# Patient Record
Sex: MALE | Race: OTHER | HISPANIC OR LATINO | Employment: FULL TIME | URBAN - METROPOLITAN AREA
[De-identification: names, ages, dates, MRNs, and addresses within clinical notes are randomized per-mention and may not be internally consistent; named-entity substitution may affect disease eponyms.]

---
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
## 2019-01-24 ENCOUNTER — OFFICE VISIT (OUTPATIENT)
Dept: MEDICAL GROUP | Facility: MEDICAL CENTER | Age: 19
End: 2019-01-24
Payer: COMMERCIAL

## 2019-01-24 VITALS
TEMPERATURE: 98.9 F | OXYGEN SATURATION: 93 % | HEIGHT: 70 IN | SYSTOLIC BLOOD PRESSURE: 106 MMHG | HEART RATE: 108 BPM | DIASTOLIC BLOOD PRESSURE: 60 MMHG | BODY MASS INDEX: 37.16 KG/M2 | WEIGHT: 259.6 LBS

## 2019-01-24 DIAGNOSIS — E66.09 CLASS 2 OBESITY DUE TO EXCESS CALORIES WITHOUT SERIOUS COMORBIDITY WITH BODY MASS INDEX (BMI) OF 37.0 TO 37.9 IN ADULT: ICD-10-CM

## 2019-01-24 DIAGNOSIS — R41.840 DISTURBED CONCENTRATION: ICD-10-CM

## 2019-01-24 DIAGNOSIS — F32.1 CURRENT MODERATE EPISODE OF MAJOR DEPRESSIVE DISORDER, UNSPECIFIED WHETHER RECURRENT (HCC): ICD-10-CM

## 2019-01-24 PROCEDURE — 99214 OFFICE O/P EST MOD 30 MIN: CPT | Performed by: PHYSICIAN ASSISTANT

## 2019-01-24 NOTE — ASSESSMENT & PLAN NOTE
Patient feels that he has trouble concentrating in class, doing homework and during tests. Also notices this during conversations with friends. Has not been testing for ADD/ADHD previously but would be interested in having this done.

## 2019-01-24 NOTE — ASSESSMENT & PLAN NOTE
>>ASSESSMENT AND PLAN FOR MAJOR DEPRESSIVE DISORDER WRITTEN ON 1/24/2019  9:27 AM BY GREGORY CARRERA P.A.-C.    Feels the wellbutrin 150mg XL is working, fewer sad episodes, more energy. Still having some anxiety

## 2019-01-24 NOTE — PROGRESS NOTES
"Subjective:   Amish Curry is a 18 y.o. male here today for follow up on depression    Disturbed concentration  Patient feels that he has trouble concentrating in class, doing homework and during tests. Also notices this during conversations with friends. Has not been testing for ADD/ADHD previously but would be interested in having this done.    Class 2 obesity due to excess calories without serious comorbidity with body mass index (BMI) of 37.0 to 37.9 in adult  Trying to get out more and exercise. Still having trouble with eating too much.    Major depressive disorder  Feels the wellbutrin 150mg XL is working, fewer sad episodes, more energy. Still having some anxiety       Current medicines (including changes today)  Current Outpatient Prescriptions   Medication Sig Dispense Refill   • buPROPion SR (WELLBUTRIN-SR) 150 MG TABLET SR 12 HR sustained-release tablet Take 1 Tab by mouth every day for 30 days. 30 Tab 3     No current facility-administered medications for this visit.      He  has a past medical history of Allergy; Blurred vision; Breath shortness; Headache; Other specified disorder of intestines; and Pain.    ROS   No fever/chills. No weight change. No headache/dizziness. No focal weakness. No sore throat, nasal congestion, ear pain. No chest pain, no shortness of breath, difficulty breathing. No n/v/d/c or abdominal pain. No urinary complaint. No rash or skin lesion. No joint pain or swelling.     Objective:     Blood pressure 106/60, pulse (!) 108, temperature 37.2 °C (98.9 °F), temperature source Temporal, height 1.778 m (5' 10\"), weight 117.8 kg (259 lb 9.6 oz), SpO2 93 %. Body mass index is 37.25 kg/m².   Physical Exam:  Constitutional: WDWN, NAD  Skin: Warm, dry, good turgor, no rashes in visible areas.  Psych: Alert and oriented x3, normal affect and mood.    Assessment and Plan:   The following treatment plan was discussed    1. Current moderate episode of major depressive disorder, " unspecified whether recurrent (HCC)  Cont current, discussed daily exercise, asked patient to consider therapy, he is still not sure he wants to do therapy    2. Disturbed concentration    - REFERRAL TO PSYCHOLOGY    3. Class 2 obesity due to excess calories without serious comorbidity with body mass index (BMI) of 37.0 to 37.9 in adult        Followup: Return in about 3 months (around 4/24/2019).

## 2019-01-30 DIAGNOSIS — F32.1 CURRENT MODERATE EPISODE OF MAJOR DEPRESSIVE DISORDER, UNSPECIFIED WHETHER RECURRENT (HCC): ICD-10-CM

## 2019-01-30 RX ORDER — BUPROPION HYDROCHLORIDE 150 MG/1
150 TABLET, EXTENDED RELEASE ORAL DAILY
Qty: 30 TAB | Refills: 3 | Status: SHIPPED | OUTPATIENT
Start: 2019-01-30 | End: 2019-03-01

## 2019-01-30 NOTE — TELEPHONE ENCOUNTER
Was the patient seen in the last year in this department? Yes    Does patient have an active prescription for medications requested? No     Received Request Via: Pharmacy     Future Appointments       Provider Department [place]    4/24/2019 9:00 AM Lilia Kern P.A.-C. [facility]

## 2019-04-09 ENCOUNTER — OFFICE VISIT (OUTPATIENT)
Dept: MEDICAL GROUP | Facility: MEDICAL CENTER | Age: 19
End: 2019-04-09
Payer: COMMERCIAL

## 2019-04-09 ENCOUNTER — PATIENT MESSAGE (OUTPATIENT)
Dept: MEDICAL GROUP | Facility: MEDICAL CENTER | Age: 19
End: 2019-04-09

## 2019-04-09 VITALS
DIASTOLIC BLOOD PRESSURE: 66 MMHG | HEIGHT: 70 IN | OXYGEN SATURATION: 94 % | TEMPERATURE: 99.1 F | SYSTOLIC BLOOD PRESSURE: 110 MMHG | WEIGHT: 271.8 LBS | HEART RATE: 95 BPM | RESPIRATION RATE: 14 BRPM | BODY MASS INDEX: 38.91 KG/M2

## 2019-04-09 DIAGNOSIS — H60.391 OTHER INFECTIVE ACUTE OTITIS EXTERNA OF RIGHT EAR: ICD-10-CM

## 2019-04-09 PROCEDURE — 99214 OFFICE O/P EST MOD 30 MIN: CPT | Performed by: PHYSICIAN ASSISTANT

## 2019-04-09 RX ORDER — AMOXICILLIN 500 MG/1
500 CAPSULE ORAL 3 TIMES DAILY
Qty: 30 CAP | Refills: 0 | Status: SHIPPED | OUTPATIENT
Start: 2019-04-09 | End: 2019-04-24

## 2019-04-09 NOTE — PROGRESS NOTES
"Subjective:      Amish Curry is a 19 y.o. male who presents with Otalgia (pain and pressure, mostly in R ear, ringing in R ear, some discomfort in L, comes and goes)            HPIPatient presents with complaint of ear pain. R>L. Chronic, episodic. No injury or trauma. No swimming. Sometimes feels like wetness may be in ear but no actual discharge from ear. No meds or treatments tried. No nasal congestion, sinus pain, headache. No ringing in ears, no hearing loss. No cold symptoms. No allergy symptoms.    ROS no fever/chills. No headache/dizziness. No neck pain or stiffness. No chest pain, SOB or difficulty breathing. No n/v/d/c or abdominal pain. No rash or skin lesion. No joint pain or swelling.    Active Ambulatory Problems     Diagnosis Date Noted   • Family history of diabetes mellitus type II 03/03/2015   • Major depressive disorder 11/28/2018   • History of eye surgery 11/28/2018   • Abnormal vision 11/28/2018   • Class 2 obesity due to excess calories without serious comorbidity with body mass index (BMI) of 37.0 to 37.9 in adult 11/28/2018   • Vegetarian diet 11/28/2018   • Prediabetes 12/13/2018   • Disturbed concentration 01/24/2019     Resolved Ambulatory Problems     Diagnosis Date Noted   • Vegan 03/03/2015   • Obesity (BMI 30-39.9) 03/03/2015   • RUQ abdominal pain 03/03/2015   • Gastroesophageal reflux disease with esophagitis 06/01/2015   • Bilateral posterior neck pain 06/01/2015   • Other specified disorder of gallbladder 08/06/2015     Past Medical History:   Diagnosis Date   • Allergy    • Blurred vision    • Breath shortness    • Headache    • Other specified disorder of intestines    • Pain    patient not taking any regular meds  nkda  Non-smoker   Objective:     /66 (BP Location: Left arm, Patient Position: Sitting, BP Cuff Size: Adult long)   Pulse 95   Temp 37.3 °C (99.1 °F) (Temporal)   Resp 14   Ht 1.778 m (5' 10\")   Wt 123.3 kg (271 lb 12.8 oz)   SpO2 94%   BMI " 39.00 kg/m²      Physical Exam   Constitutional: He is oriented to person, place, and time. He appears well-developed and well-nourished. No distress.   HENT:   Head: Normocephalic and atraumatic.   Right Ear: Tympanic membrane and external ear normal. There is swelling.   Left Ear: Hearing, tympanic membrane, external ear and ear canal normal.   Right canal with erythema and swelling   Eyes: Conjunctivae are normal.   Neck: Normal range of motion. Neck supple.   Lymphadenopathy:     He has no cervical adenopathy.   Neurological: He is alert and oriented to person, place, and time.   Skin: Skin is warm and dry. No rash noted. He is not diaphoretic. No pallor.   Psychiatric: He has a normal mood and affect. His behavior is normal. Judgment and thought content normal.   Vitals reviewed.              Assessment/Plan:     1. Other infective acute otitis externa of right ear    - ciprofloxacin (CIPRO HC OTIC) 0.2-1 % Suspension; Place 3 Drops in right ear 2 times a day for 7 days.  Dispense: 1 Bottle; Refill: 0  - amoxicillin (AMOXIL) 500 MG Cap; Take 1 Cap by mouth 3 times a day.  Dispense: 30 Cap; Refill: 0    F/u prn

## 2019-04-24 ENCOUNTER — OFFICE VISIT (OUTPATIENT)
Dept: MEDICAL GROUP | Facility: MEDICAL CENTER | Age: 19
End: 2019-04-24
Payer: COMMERCIAL

## 2019-04-24 VITALS
BODY MASS INDEX: 39.05 KG/M2 | OXYGEN SATURATION: 92 % | TEMPERATURE: 98.6 F | HEIGHT: 70 IN | WEIGHT: 272.8 LBS | SYSTOLIC BLOOD PRESSURE: 118 MMHG | HEART RATE: 74 BPM | DIASTOLIC BLOOD PRESSURE: 68 MMHG | RESPIRATION RATE: 16 BRPM

## 2019-04-24 DIAGNOSIS — M26.623 TENDERNESS OF BOTH TEMPOROMANDIBULAR JOINTS: ICD-10-CM

## 2019-04-24 DIAGNOSIS — F32.1 CURRENT MODERATE EPISODE OF MAJOR DEPRESSIVE DISORDER, UNSPECIFIED WHETHER RECURRENT (HCC): ICD-10-CM

## 2019-04-24 DIAGNOSIS — F41.9 ANXIETY: ICD-10-CM

## 2019-04-24 DIAGNOSIS — R41.840 DISTURBED CONCENTRATION: ICD-10-CM

## 2019-04-24 PROCEDURE — 99214 OFFICE O/P EST MOD 30 MIN: CPT | Performed by: PHYSICIAN ASSISTANT

## 2019-04-24 RX ORDER — BUSPIRONE HYDROCHLORIDE 7.5 MG/1
7.5 TABLET ORAL 2 TIMES DAILY
Qty: 60 TAB | Refills: 0 | Status: SHIPPED | OUTPATIENT
Start: 2019-04-24 | End: 2019-05-24

## 2019-04-24 NOTE — PROGRESS NOTES
Subjective:      Amish Curry is a 19 y.o. male who presents with Follow-Up (depression) and Otalgia (took course of abx, still having ear pain, states problem came back)            HPI  Patient presents with a few complaints:  1. bilat ear pain off and on. Does also have jaw/tmj pain. Some jaw clicking. Hasn't been to the dentist in a few years.   2. Stopped wellbutrin d/t focus worsening, having more anxiety, willing to see psychiatrist, would also be willing to try a new medication, not feeling depressed  3. Did see someone regarding concentration issues but did not prescribe medication, he would like to see the psychiatrist for this lisha    Edel weight change. No fever/chills. No headache/dizziness. No sinus pain or nasal congestion. No neck pain or stiffness. No n/v/d/c or abdominal pain. No rash or skin lesion. No joint pain or swelling    Active Ambulatory Problems     Diagnosis Date Noted   • Family history of diabetes mellitus type II 03/03/2015   • Major depressive disorder 11/28/2018   • History of eye surgery 11/28/2018   • Abnormal vision 11/28/2018   • Class 2 obesity due to excess calories without serious comorbidity with body mass index (BMI) of 37.0 to 37.9 in adult 11/28/2018   • Vegetarian diet 11/28/2018   • Prediabetes 12/13/2018   • Disturbed concentration 01/24/2019     Resolved Ambulatory Problems     Diagnosis Date Noted   • Vegan 03/03/2015   • Obesity (BMI 30-39.9) 03/03/2015   • RUQ abdominal pain 03/03/2015   • Gastroesophageal reflux disease with esophagitis 06/01/2015   • Bilateral posterior neck pain 06/01/2015   • Other specified disorder of gallbladder 08/06/2015     Past Medical History:   Diagnosis Date   • Allergy    • Blurred vision    • Breath shortness    • Headache    • Other specified disorder of intestines    • Pain      Current Outpatient Prescriptions   Medication Sig Dispense Refill   • busPIRone (BUSPAR) 7.5 MG tablet Take 1 Tab by mouth 2 times a day for 30  "days. 60 Tab 0     No current facility-administered medications for this visit.    nkda  Non-smoker   Objective:     /68 (BP Location: Left arm, Patient Position: Sitting, BP Cuff Size: Adult)   Pulse 74   Temp 37 °C (98.6 °F) (Temporal)   Resp 16   Ht 1.778 m (5' 10\")   Wt 123.7 kg (272 lb 12.8 oz)   SpO2 92%   BMI 39.14 kg/m²      Physical Exam   Constitutional: He is oriented to person, place, and time. He appears well-developed and well-nourished. No distress.   HENT:   Head: Normocephalic and atraumatic.       Right Ear: Hearing, tympanic membrane, external ear and ear canal normal.   Left Ear: Hearing, tympanic membrane, external ear and ear canal normal.   Nose: Nose normal. Right sinus exhibits no maxillary sinus tenderness and no frontal sinus tenderness. Left sinus exhibits no maxillary sinus tenderness and no frontal sinus tenderness.   Mouth/Throat: Uvula is midline, oropharynx is clear and moist and mucous membranes are normal.   Mild ttp, no visible redness or swelling   Eyes: Conjunctivae are normal.   Neck: Normal range of motion. Neck supple.   Lymphadenopathy:     He has no cervical adenopathy.   Neurological: He is alert and oriented to person, place, and time.   Skin: Skin is warm and dry. No rash noted. He is not diaphoretic. No pallor.   Psychiatric: He has a normal mood and affect. His behavior is normal. Judgment and thought content normal.   Vitals reviewed.              Assessment/Plan:     1. Disturbed concentration    - REFERRAL TO PSYCHIATRY    2. Current moderate episode of major depressive disorder, unspecified whether recurrent (HCC)    - REFERRAL TO PSYCHIATRY    3. Anxiety  Discussed risk/benefit and potential side effects of medication  - REFERRAL TO PSYCHIATRY  - busPIRone (BUSPAR) 7.5 MG tablet; Take 1 Tab by mouth 2 times a day for 30 days.  Dispense: 60 Tab; Refill: 0    4. Tenderness of both temporomandibular joints  Soft foods, warm compresses, ibuprofen, f/u " with dentist

## 2019-08-01 ENCOUNTER — NON-PROVIDER VISIT (OUTPATIENT)
Dept: URGENT CARE | Facility: PHYSICIAN GROUP | Age: 19
End: 2019-08-01

## 2019-08-01 DIAGNOSIS — Z02.1 PRE-EMPLOYMENT DRUG SCREENING: ICD-10-CM

## 2019-08-01 LAB
AMP AMPHETAMINE: NORMAL
COC COCAINE: NORMAL
INT CON NEG: NEGATIVE
INT CON POS: POSITIVE
MET METHAMPHETAMINES: NORMAL
OPI OPIATES: NORMAL
PCP PHENCYCLIDINE: NORMAL
POC DRUG COMMENT 753798-OCCUPATIONAL HEALTH: NEGATIVE
THC: NORMAL

## 2019-08-01 PROCEDURE — 80305 DRUG TEST PRSMV DIR OPT OBS: CPT | Performed by: PHYSICIAN ASSISTANT

## 2019-12-30 ENCOUNTER — OFFICE VISIT (OUTPATIENT)
Dept: URGENT CARE | Facility: CLINIC | Age: 19
End: 2019-12-30
Payer: COMMERCIAL

## 2019-12-30 VITALS
TEMPERATURE: 98.7 F | WEIGHT: 272 LBS | HEIGHT: 70 IN | BODY MASS INDEX: 38.94 KG/M2 | SYSTOLIC BLOOD PRESSURE: 122 MMHG | OXYGEN SATURATION: 95 % | DIASTOLIC BLOOD PRESSURE: 68 MMHG | HEART RATE: 73 BPM | RESPIRATION RATE: 15 BRPM

## 2019-12-30 DIAGNOSIS — R10.9 FLANK PAIN: ICD-10-CM

## 2019-12-30 DIAGNOSIS — R31.9 HEMATURIA, UNSPECIFIED TYPE: ICD-10-CM

## 2019-12-30 LAB
APPEARANCE UR: CLEAR
BILIRUB UR STRIP-MCNC: NEGATIVE MG/DL
COLOR UR AUTO: YELLOW
GLUCOSE UR STRIP.AUTO-MCNC: NEGATIVE MG/DL
KETONES UR STRIP.AUTO-MCNC: NEGATIVE MG/DL
LEUKOCYTE ESTERASE UR QL STRIP.AUTO: NEGATIVE
NITRITE UR QL STRIP.AUTO: NEGATIVE
PH UR STRIP.AUTO: 5.5 [PH] (ref 5–8)
PROT UR QL STRIP: NEGATIVE MG/DL
RBC UR QL AUTO: NORMAL
SP GR UR STRIP.AUTO: 1.03
UROBILINOGEN UR STRIP-MCNC: 0.2 MG/DL

## 2019-12-30 PROCEDURE — 81002 URINALYSIS NONAUTO W/O SCOPE: CPT | Performed by: NURSE PRACTITIONER

## 2019-12-30 PROCEDURE — 99214 OFFICE O/P EST MOD 30 MIN: CPT | Performed by: NURSE PRACTITIONER

## 2019-12-30 ASSESSMENT — ENCOUNTER SYMPTOMS
NAUSEA: 0
VOMITING: 0
CHANGE IN BOWEL HABIT: 0
ABDOMINAL PAIN: 1
PAIN: 1
MYALGIAS: 0
FATIGUE: 0
CHILLS: 0
DIZZINESS: 0
SHORTNESS OF BREATH: 0
EYE PAIN: 0
FEVER: 0
SORE THROAT: 0
FLANK PAIN: 1

## 2019-12-30 NOTE — LETTER
December 30, 2019         Patient: Amish Curry   YOB: 2000   Date of Visit: 12/30/2019           To Whom it May Concern:    Amish Curry was seen in my clinic on 12/30/2019. He may return to work on 12/31/19.    If you have any questions or concerns, please don't hesitate to call.        Sincerely,           BERTA Peacock.  Electronically Signed

## 2019-12-31 ENCOUNTER — TELEPHONE (OUTPATIENT)
Dept: URGENT CARE | Facility: CLINIC | Age: 19
End: 2019-12-31

## 2019-12-31 NOTE — PROGRESS NOTES
"Subjective:   Amish Curry  is a 19 y.o. male who presents for GI Problem        Pain   This is a new problem. The current episode started in the past 7 days. The problem occurs intermittently. The problem has been waxing and waning. Associated symptoms include abdominal pain. Pertinent negatives include no change in bowel habit, chest pain, chills, fatigue, fever, myalgias, nausea, rash, sore throat, urinary symptoms or vomiting. Nothing aggravates the symptoms. He has tried nothing for the symptoms. The treatment provided no relief.     Review of Systems   Constitutional: Negative for chills, fatigue and fever.   HENT: Negative for sore throat.    Eyes: Negative for pain.   Respiratory: Negative for shortness of breath.    Cardiovascular: Negative for chest pain.   Gastrointestinal: Positive for abdominal pain. Negative for change in bowel habit, nausea and vomiting.   Genitourinary: Positive for flank pain. Negative for dysuria, frequency, hematuria and urgency.   Musculoskeletal: Negative for myalgias.   Skin: Negative for rash.   Neurological: Negative for dizziness.     No Known Allergies   Objective:   /68   Pulse 73   Temp 37.1 °C (98.7 °F) (Temporal)   Resp 15   Ht 1.778 m (5' 10\")   Wt 123.4 kg (272 lb)   SpO2 95%   BMI 39.03 kg/m²   Physical Exam  Vitals signs and nursing note reviewed.   Constitutional:       General: He is not in acute distress.     Appearance: He is well-developed.   HENT:      Head: Normocephalic and atraumatic.      Right Ear: External ear normal.      Left Ear: External ear normal.      Nose: Nose normal.      Mouth/Throat:      Mouth: Mucous membranes are moist.   Eyes:      Conjunctiva/sclera: Conjunctivae normal.      Pupils: Pupils are equal, round, and reactive to light.   Cardiovascular:      Rate and Rhythm: Normal rate and regular rhythm.      Heart sounds: No murmur.   Pulmonary:      Effort: Pulmonary effort is normal. No respiratory distress.      " Breath sounds: Normal breath sounds.   Abdominal:      General: Bowel sounds are normal. There is no distension.      Palpations: Abdomen is soft.      Tenderness: There is tenderness in the suprapubic area and left lower quadrant. There is left CVA tenderness.   Musculoskeletal: Normal range of motion.   Skin:     General: Skin is warm and dry.   Neurological:      General: No focal deficit present.      Mental Status: He is alert and oriented to person, place, and time. Mental status is at baseline.      Gait: Gait (gait at baseline) normal.   Psychiatric:         Judgment: Judgment normal.           Assessment/Plan:   1. Flank pain  - POCT Urinalysis  - CT-RENAL COLIC EVALUATION(A/P W/O); Future    2. Hematuria, unspecified type  - POCT Urinalysis  - CT-RENAL COLIC EVALUATION(A/P W/O); Future    Patient is a 19-year-old male presents clinic today for evaluation for the stated above.  Patient is nontoxic appearing, patient with mild abdominal tenderness, left-sided flank pain, hematuria differentials include but not limited to kidney stone.  We will follow-up with pending CT results and treat as indicated.  At clinical location and clinical our CT will need to be scheduled outpatient for tomorrow a.m.  Advised may take 600-800 mg ibuprofen 3 times daily as needed for pain.     Patient given precautionary s/sx that mandate immediate follow up and evaluation in the ED. Advised of risks of not doing so.    DDX, Supportive care, and indications for immediate follow-up discussed with patient.    Instructed to return to clinic or nearest emergency department if we are not available for any change in condition, further concerns, or worsening of symptoms.    The patient demonstrated a good understanding and agreed with the treatment plan.

## 2019-12-31 NOTE — TELEPHONE ENCOUNTER
Attempted to make an appointment for CT but he has [employer] for health insurance. He has to go through [facility]. I called them to schedule an appointment and unfortunley there are no CT techs today.

## 2020-01-13 ENCOUNTER — OFFICE VISIT (OUTPATIENT)
Dept: MEDICAL GROUP | Facility: MEDICAL CENTER | Age: 20
End: 2020-01-13
Payer: COMMERCIAL

## 2020-01-13 ENCOUNTER — HOSPITAL ENCOUNTER (OUTPATIENT)
Facility: MEDICAL CENTER | Age: 20
End: 2020-01-13
Attending: FAMILY MEDICINE
Payer: COMMERCIAL

## 2020-01-13 VITALS
WEIGHT: 241.2 LBS | HEART RATE: 71 BPM | OXYGEN SATURATION: 97 % | BODY MASS INDEX: 34.53 KG/M2 | TEMPERATURE: 97.5 F | HEIGHT: 70 IN | SYSTOLIC BLOOD PRESSURE: 108 MMHG | DIASTOLIC BLOOD PRESSURE: 64 MMHG | RESPIRATION RATE: 18 BRPM

## 2020-01-13 DIAGNOSIS — R73.03 PREDIABETES: ICD-10-CM

## 2020-01-13 DIAGNOSIS — R31.9 HEMATURIA, UNSPECIFIED TYPE: ICD-10-CM

## 2020-01-13 LAB
APPEARANCE UR: CLEAR
APPEARANCE UR: NEGATIVE
BACTERIA #/AREA URNS HPF: NEGATIVE /HPF
BILIRUB UR QL STRIP.AUTO: NEGATIVE
BILIRUB UR STRIP-MCNC: NEGATIVE MG/DL
COLOR UR AUTO: NORMAL
COLOR UR: YELLOW
EPI CELLS #/AREA URNS HPF: NEGATIVE /HPF
GLUCOSE UR STRIP.AUTO-MCNC: NEGATIVE MG/DL
GLUCOSE UR STRIP.AUTO-MCNC: NEGATIVE MG/DL
HYALINE CASTS #/AREA URNS LPF: ABNORMAL /LPF
KETONES UR STRIP.AUTO-MCNC: NEGATIVE MG/DL
KETONES UR STRIP.AUTO-MCNC: NEGATIVE MG/DL
LEUKOCYTE ESTERASE UR QL STRIP.AUTO: NEGATIVE
LEUKOCYTE ESTERASE UR QL STRIP.AUTO: NEGATIVE
MICRO URNS: ABNORMAL
NITRITE UR QL STRIP.AUTO: NEGATIVE
NITRITE UR QL STRIP.AUTO: NEGATIVE
PH UR STRIP.AUTO: 6.5 [PH] (ref 5–8)
PH UR STRIP.AUTO: 7 [PH] (ref 5–8)
PROT UR QL STRIP: NEGATIVE MG/DL
PROT UR QL STRIP: NORMAL MG/DL
RBC # URNS HPF: ABNORMAL /HPF
RBC UR QL AUTO: ABNORMAL
RBC UR QL AUTO: NORMAL
SP GR UR STRIP.AUTO: 1.02
SP GR UR STRIP.AUTO: 1.02
UROBILINOGEN UR STRIP-MCNC: NORMAL MG/DL
UROBILINOGEN UR STRIP.AUTO-MCNC: 0.2 MG/DL
WBC #/AREA URNS HPF: ABNORMAL /HPF

## 2020-01-13 PROCEDURE — 81002 URINALYSIS NONAUTO W/O SCOPE: CPT | Performed by: FAMILY MEDICINE

## 2020-01-13 PROCEDURE — 87591 N.GONORRHOEAE DNA AMP PROB: CPT

## 2020-01-13 PROCEDURE — 81001 URINALYSIS AUTO W/SCOPE: CPT

## 2020-01-13 PROCEDURE — 99214 OFFICE O/P EST MOD 30 MIN: CPT | Performed by: FAMILY MEDICINE

## 2020-01-13 PROCEDURE — 99000 SPECIMEN HANDLING OFFICE-LAB: CPT | Performed by: FAMILY MEDICINE

## 2020-01-13 PROCEDURE — 87491 CHLMYD TRACH DNA AMP PROBE: CPT

## 2020-01-13 ASSESSMENT — PATIENT HEALTH QUESTIONNAIRE - PHQ9
7. TROUBLE CONCENTRATING ON THINGS, SUCH AS READING THE NEWSPAPER OR WATCHING TELEVISION: MORE THAN HALF THE DAYS
2. FEELING DOWN, DEPRESSED, IRRITABLE, OR HOPELESS: MORE THAN HALF THE DAYS
SUM OF ALL RESPONSES TO PHQ QUESTIONS 1-9: 16
4. FEELING TIRED OR HAVING LITTLE ENERGY: MORE THAN HALF THE DAYS
3. TROUBLE FALLING OR STAYING ASLEEP OR SLEEPING TOO MUCH: MORE THAN HALF THE DAYS
8. MOVING OR SPEAKING SO SLOWLY THAT OTHER PEOPLE COULD HAVE NOTICED. OR THE OPPOSITE, BEING SO FIGETY OR RESTLESS THAT YOU HAVE BEEN MOVING AROUND A LOT MORE THAN USUAL: MORE THAN HALF THE DAYS
9. THOUGHTS THAT YOU WOULD BE BETTER OFF DEAD, OR OF HURTING YOURSELF: NOT AT ALL
6. FEELING BAD ABOUT YOURSELF - OR THAT YOU ARE A FAILURE OR HAVE LET YOURSELF OR YOUR FAMILY DOWN: MORE THAN HALF THE DAYS
SUM OF ALL RESPONSES TO PHQ9 QUESTIONS 1 AND 2: 4
5. POOR APPETITE OR OVEREATING: MORE THAN HALF THE DAYS
1. LITTLE INTEREST OR PLEASURE IN DOING THINGS: MORE THAN HALF THE DAYS

## 2020-01-13 NOTE — PROGRESS NOTES
[facility]  Progress Note  Established Patient    Subjective:   Amish Curry is a 19 y.o. male here today with a chief complaint of blood in urine. The patient is alone.     Hematuria  Presents with a new problem.  For the past few weeks he has had intermittent episodes of pink-tinged urine.  He denies clots.  It tends to be worse when he is not drinking water or first thing in the morning.  He associates some mild dysuria with urinary urgency.  He denies urinary incontinence.  He denies testicular pain.  He denies abnormal penile discharge.  He went to the urgent care where a urinalysis demonstrated hematuria.  They ordered a CT scan but he did not get this done.  Patient does report a concurrent history of lower back pain which may have preexisted but no flank pain.  His symptoms are mild in severity.  He does not identify any alleviating factors    Prediabetes  Patient has a history of prediabetes.  He states he would like to recheck this today.      No current outpatient medications on file prior to visit.     No current facility-administered medications on file prior to visit.        Past Medical History:   Diagnosis Date   • Allergy    • Blurred vision    • Breath shortness     sometimes with gall bladder attack   • Headache    • Other specified disorder of intestines     constipation/diarrhea   • Pain     neck, back, chest, right upper abd       Allergies: Patient has no known allergies.    Surgical History:  has a past surgical history that includes emelina by laparoscopy (N/A, 8/6/2015); eye surgery; and eye muscle repair.    Family History: family history includes Diabetes in his maternal grandmother and paternal grandmother; Hyperlipidemia in his father.    Social History:  reports that he has never smoked. He has never used smokeless tobacco. He reports current alcohol use. He reports that he does not use drugs.    ROS: see HPI.        Objective:     Vitals:    01/13/20 1437   BP: 108/64  "  BP Location: Left arm   Patient Position: Sitting   BP Cuff Size: Adult   Pulse: 71   Resp: 18   Temp: 36.4 °C (97.5 °F)   TempSrc: Temporal   SpO2: 97%   Weight: 109.4 kg (241 lb 3.2 oz)   Height: 1.778 m (5' 10\")       Physical Exam:  General: alert in no apparent distress.   Back: no CVAT.   GI: no suprapubic tenderness.     POC UA: blood.         Assessment and Plan:     1. Hematuria, unspecified type  Patient reports gross hematuria with blood in the urine on point-of-care urinalysis.  There is no other evidence of infection on urinalysis.  The patient does describe some back pain but it certainly does not sound consistent with a nephrolithiasis type pain.  Because of the uncertain clinical history, I will send him out for the following labs and a formal urinalysis with GC and Chlamydia testing.  Before I order imaging, I also think would be wise to have the patient see urology.  He may require cystoscopy, CT urogram or a CT renal colic so I think it would be best if they decided before we subject him to 1 of these tests, especially considering the mild nature of his complaints.  - CBC WITH DIFFERENTIAL; Future  - URINALYSIS,CULTURE IF INDICATED; Future  - Chlamydia/GC PCR Urine Or Swab; Future  - POCT Urinalysis  - REFERRAL TO UROLOGY, urgent consult placed.    2. Prediabetes  - HEMOGLOBIN A1C; Future  - Comp Metabolic Panel; Future        Followup: Return if symptoms worsen or fail to improve.         "

## 2020-01-13 NOTE — ASSESSMENT & PLAN NOTE
Presents with a new problem.  For the past few weeks he has had intermittent episodes of pink-tinged urine.  He denies clots.  It tends to be worse when he is not drinking water or first thing in the morning.  He associates some mild dysuria with urinary urgency.  He denies urinary incontinence.  He denies testicular pain.  He denies abnormal penile discharge.  He went to the urgent care where a urinalysis demonstrated hematuria.  They ordered a CT scan but he did not get this done.  Patient does report a concurrent history of lower back pain which may have preexisted but no flank pain.  His symptoms are mild in severity.  He does not identify any alleviating factors

## 2020-01-14 ENCOUNTER — HOSPITAL ENCOUNTER (OUTPATIENT)
Dept: LAB | Facility: MEDICAL CENTER | Age: 20
End: 2020-01-14
Attending: FAMILY MEDICINE
Payer: COMMERCIAL

## 2020-01-14 DIAGNOSIS — R73.03 PREDIABETES: ICD-10-CM

## 2020-01-14 DIAGNOSIS — R31.9 HEMATURIA, UNSPECIFIED TYPE: ICD-10-CM

## 2020-01-14 LAB
ALBUMIN SERPL BCP-MCNC: 4.3 G/DL (ref 3.2–4.9)
ALBUMIN/GLOB SERPL: 1.5 G/DL
ALP SERPL-CCNC: 91 U/L (ref 30–99)
ALT SERPL-CCNC: 19 U/L (ref 2–50)
ANION GAP SERPL CALC-SCNC: 8 MMOL/L (ref 0–11.9)
AST SERPL-CCNC: 14 U/L (ref 12–45)
BASOPHILS # BLD AUTO: 1.2 % (ref 0–1.8)
BASOPHILS # BLD: 0.1 K/UL (ref 0–0.12)
BILIRUB SERPL-MCNC: 0.4 MG/DL (ref 0.1–1.5)
BUN SERPL-MCNC: 11 MG/DL (ref 8–22)
C TRACH DNA SPEC QL NAA+PROBE: NEGATIVE
CALCIUM SERPL-MCNC: 9.1 MG/DL (ref 8.5–10.5)
CHLORIDE SERPL-SCNC: 101 MMOL/L (ref 96–112)
CO2 SERPL-SCNC: 29 MMOL/L (ref 20–33)
CREAT SERPL-MCNC: 0.62 MG/DL (ref 0.5–1.4)
EOSINOPHIL # BLD AUTO: 0.08 K/UL (ref 0–0.51)
EOSINOPHIL NFR BLD: 0.9 % (ref 0–6.9)
ERYTHROCYTE [DISTWIDTH] IN BLOOD BY AUTOMATED COUNT: 47.3 FL (ref 35.9–50)
EST. AVERAGE GLUCOSE BLD GHB EST-MCNC: 103 MG/DL
FASTING STATUS PATIENT QL REPORTED: NORMAL
GLOBULIN SER CALC-MCNC: 2.9 G/DL (ref 1.9–3.5)
GLUCOSE SERPL-MCNC: 78 MG/DL (ref 65–99)
HBA1C MFR BLD: 5.2 % (ref 0–5.6)
HCT VFR BLD AUTO: 52.9 % (ref 42–52)
HGB BLD-MCNC: 16.5 G/DL (ref 14–18)
IMM GRANULOCYTES # BLD AUTO: 0.03 K/UL (ref 0–0.11)
IMM GRANULOCYTES NFR BLD AUTO: 0.3 % (ref 0–0.9)
LYMPHOCYTES # BLD AUTO: 2.73 K/UL (ref 1–4.8)
LYMPHOCYTES NFR BLD: 31.8 % (ref 22–41)
MCH RBC QN AUTO: 29.5 PG (ref 27–33)
MCHC RBC AUTO-ENTMCNC: 31.2 G/DL (ref 33.7–35.3)
MCV RBC AUTO: 94.5 FL (ref 81.4–97.8)
MONOCYTES # BLD AUTO: 0.58 K/UL (ref 0–0.85)
MONOCYTES NFR BLD AUTO: 6.8 % (ref 0–13.4)
N GONORRHOEA DNA SPEC QL NAA+PROBE: NEGATIVE
NEUTROPHILS # BLD AUTO: 5.07 K/UL (ref 1.82–7.42)
NEUTROPHILS NFR BLD: 59 % (ref 44–72)
NRBC # BLD AUTO: 0 K/UL
NRBC BLD-RTO: 0 /100 WBC
PLATELET # BLD AUTO: 388 K/UL (ref 164–446)
PMV BLD AUTO: 10.3 FL (ref 9–12.9)
POTASSIUM SERPL-SCNC: 4.2 MMOL/L (ref 3.6–5.5)
PROT SERPL-MCNC: 7.2 G/DL (ref 6–8.2)
RBC # BLD AUTO: 5.6 M/UL (ref 4.7–6.1)
SODIUM SERPL-SCNC: 138 MMOL/L (ref 135–145)
SPECIMEN SOURCE: NORMAL
WBC # BLD AUTO: 8.6 K/UL (ref 4.8–10.8)

## 2020-01-14 PROCEDURE — 85025 COMPLETE CBC W/AUTO DIFF WBC: CPT

## 2020-01-14 PROCEDURE — 80053 COMPREHEN METABOLIC PANEL: CPT

## 2020-01-14 PROCEDURE — 83036 HEMOGLOBIN GLYCOSYLATED A1C: CPT

## 2020-01-14 PROCEDURE — 36415 COLL VENOUS BLD VENIPUNCTURE: CPT

## 2020-01-15 ENCOUNTER — HOSPITAL ENCOUNTER (OUTPATIENT)
Dept: LAB | Facility: MEDICAL CENTER | Age: 20
End: 2020-01-15
Attending: PHYSICIAN ASSISTANT
Payer: COMMERCIAL

## 2020-01-15 PROCEDURE — 87086 URINE CULTURE/COLONY COUNT: CPT

## 2020-01-17 LAB
BACTERIA UR CULT: ABNORMAL
BACTERIA UR CULT: ABNORMAL
SIGNIFICANT IND 70042: ABNORMAL
SITE SITE: ABNORMAL
SOURCE SOURCE: ABNORMAL

## 2020-10-25 NOTE — ASSESSMENT & PLAN NOTE
Feels the wellbutrin 150mg XL is working, fewer sad episodes, more energy. Still having some anxiety   No

## 2020-11-25 ENCOUNTER — HOSPITAL ENCOUNTER (OUTPATIENT)
Facility: MEDICAL CENTER | Age: 20
End: 2020-11-25
Attending: PHYSICIAN ASSISTANT
Payer: COMMERCIAL

## 2020-11-25 ENCOUNTER — OFFICE VISIT (OUTPATIENT)
Dept: URGENT CARE | Facility: PHYSICIAN GROUP | Age: 20
End: 2020-11-25
Payer: COMMERCIAL

## 2020-11-25 VITALS
RESPIRATION RATE: 16 BRPM | TEMPERATURE: 97 F | WEIGHT: 247 LBS | BODY MASS INDEX: 35.36 KG/M2 | OXYGEN SATURATION: 95 % | HEART RATE: 112 BPM | DIASTOLIC BLOOD PRESSURE: 80 MMHG | SYSTOLIC BLOOD PRESSURE: 112 MMHG | HEIGHT: 70 IN

## 2020-11-25 DIAGNOSIS — Z20.822 SUSPECTED COVID-19 VIRUS INFECTION: ICD-10-CM

## 2020-11-25 LAB — COVID ORDER STATUS COVID19: NORMAL

## 2020-11-25 PROCEDURE — 99214 OFFICE O/P EST MOD 30 MIN: CPT | Mod: CS | Performed by: PHYSICIAN ASSISTANT

## 2020-11-25 PROCEDURE — U0003 INFECTIOUS AGENT DETECTION BY NUCLEIC ACID (DNA OR RNA); SEVERE ACUTE RESPIRATORY SYNDROME CORONAVIRUS 2 (SARS-COV-2) (CORONAVIRUS DISEASE [COVID-19]), AMPLIFIED PROBE TECHNIQUE, MAKING USE OF HIGH THROUGHPUT TECHNOLOGIES AS DESCRIBED BY CMS-2020-01-R: HCPCS

## 2020-11-25 RX ORDER — AMOXICILLIN 500 MG/1
TABLET, FILM COATED ORAL
COMMUNITY
End: 2021-05-12

## 2020-11-25 ASSESSMENT — ENCOUNTER SYMPTOMS
CHILLS: 1
SORE THROAT: 1
NECK PAIN: 0
ABDOMINAL PAIN: 0
MYALGIAS: 1
SHORTNESS OF BREATH: 0
VOMITING: 0
FEVER: 1
COUGH: 1
WHEEZING: 0
EYE REDNESS: 0
EYE PAIN: 0
SPUTUM PRODUCTION: 1
PALPITATIONS: 0
NAUSEA: 0
DIARRHEA: 1
HEADACHES: 1
DIZZINESS: 0

## 2020-11-25 ASSESSMENT — FIBROSIS 4 INDEX: FIB4 SCORE: 0.17

## 2020-11-25 NOTE — LETTER
November 25, 2020   Patient: Amish Curry   YOB: 2000   Date of Visit: 11/25/2020     To Whom it May Concern:    Amish Curry was seen in my clinic on 11/25/2020.  Your employee was seen in our clinic today.  A concern for COVID-19 has been identified and testing is in progress. We are asking you to excuse absences while following self-isolation protocol per Center for Disease Control (CDC) guidelines.  Your employee will be able to access test results through our electronic delivery system called [employer].     If the results of testing are positive, your employee should follow the CDC guidelines.  These are isolation for a minimum of 10 days and at least 24 hours have passed since your last fever without the use of fever-reducing medications and all other symptoms have improved.  The health department may contact you and provide further directions regarding self-isolation and return to work.     Negative result without close contact*:  If your employee was tested due to their symptoms without close contact to someone with COVID-19 and their test is negative: your employee should not return to work or regular activities until 24 hours after symptoms fully improve. (For example, if patient feels back to normal on Tuesday, should remain isolated through Wednesday).      Negative with close contact*:  If your employee was tested due to close contact to someone, they should self isolate for 14 days after their exposure.    Negative with positive household member  If your employee was due to a positive household member they should still quarantine for a period of 14 days.  The 14 day period begins once the household member is isolated. If unable to quarantine (for example mom from infant), the CDC advises an additional 14-day quarantine period from the COVID-19 household member.   In general, repeat testing is not necessary and not offered through our [facility] urgent care.     This is the  only note that will be provided from [facility] for this visit.  Your employee will require an appointment with a primary care provider if FMLA or disability forms are required.  If you have any questions please do not hesitate to call me at the phone number listed below.    Sincerely,    QUE Dyer.-C.  [phone]

## 2020-11-25 NOTE — PROGRESS NOTES
Subjective:   Amish Curry is a 20 y.o. male who presents for Headache (x2days bodyaches sore throat diarrhea)      HPI  20 y.o. male presents to urgent care with new problem to provider of sore throat, headache, body aches, diarrhea, cough, and congestion worsening since onset 2 days ago.  Patient reports potential exposure to Covid 19, however this is not confirmed.  He does report his family members at home have similar symptoms.  They have not been tested for COVID-19.  Patient reports subjective fevers and chills.  He denies shortness of breath, wheezing, or chest pain.  He has not been taking any over-the-counter medications for his symptoms. Denies other associated aggravating or alleviating factors.     Review of Systems   Constitutional: Positive for chills, fever and malaise/fatigue.   HENT: Positive for congestion and sore throat. Negative for ear pain.    Eyes: Negative for pain and redness.   Respiratory: Positive for cough and sputum production. Negative for shortness of breath and wheezing.    Cardiovascular: Negative for chest pain and palpitations.   Gastrointestinal: Positive for diarrhea. Negative for abdominal pain, nausea and vomiting.   Musculoskeletal: Positive for myalgias. Negative for neck pain.   Skin: Negative for rash.   Neurological: Positive for headaches. Negative for dizziness.   Endo/Heme/Allergies: Negative for environmental allergies.       Patient Active Problem List   Diagnosis   • Family history of diabetes mellitus type II   • Major depressive disorder   • History of eye surgery   • Abnormal vision   • Class 2 obesity due to excess calories without serious comorbidity with body mass index (BMI) of 37.0 to 37.9 in adult   • Vegetarian diet   • Prediabetes   • Disturbed concentration   • Hematuria     Past Surgical History:   Procedure Laterality Date   • COURTNEY BY LAPAROSCOPY N/A 8/6/2015    Procedure: COURTNEY BY LAPAROSCOPY;  Surgeon: Roya Adams M.D.;  Location:  "SURGERY [facility];  Service:    • EYE MUSCLE REPAIR     • EYE SURGERY       Social History     Tobacco Use   • Smoking status: Never Smoker   • Smokeless tobacco: Never Used   Substance Use Topics   • Alcohol use: Yes   • Drug use: No      Family History   Problem Relation Age of Onset   • Hyperlipidemia Father    • Diabetes Maternal Grandmother    • Diabetes Paternal Grandmother    • Stroke Neg Hx    • Heart Attack Neg Hx    • Heart Disease Neg Hx    • Cancer Neg Hx       (Allergies, Medications, & Tobacco/Substance Use were reconciled by the Medical Assistant and reviewed by myself. The family history is prepopulated)     Objective:     /80   Pulse (!) 112   Temp 36.1 °C (97 °F) (Temporal)   Resp 16   Ht 1.778 m (5' 10\")   Wt 112 kg (247 lb)   SpO2 95%   BMI 35.44 kg/m²     Physical Exam  Vitals signs reviewed.   Constitutional:       General: He is not in acute distress.     Appearance: Normal appearance. He is well-developed. He is not ill-appearing or diaphoretic.   HENT:      Head: Normocephalic and atraumatic.      Nose: Nose normal.      Mouth/Throat:      Mouth: Mucous membranes are moist.      Pharynx: Oropharynx is clear. Posterior oropharyngeal erythema present. No oropharyngeal exudate.   Eyes:      General: No scleral icterus.     Conjunctiva/sclera: Conjunctivae normal.   Neck:      Musculoskeletal: Normal range of motion and neck supple.   Cardiovascular:      Rate and Rhythm: Regular rhythm. Tachycardia present.      Heart sounds: Normal heart sounds.   Pulmonary:      Effort: Pulmonary effort is normal. No respiratory distress.      Breath sounds: Normal breath sounds. No wheezing, rhonchi or rales.   Musculoskeletal: Normal range of motion.   Lymphadenopathy:      Cervical: No cervical adenopathy.   Skin:     General: Skin is warm and dry.      Findings: No rash.   Neurological:      General: No focal deficit present.      Mental Status: He is alert and oriented to person, " place, and time.   Psychiatric:         Mood and Affect: Mood normal.         Behavior: Behavior normal.         Thought Content: Thought content normal.         Judgment: Judgment normal.         Assessment/Plan:     1. Suspected COVID-19 virus infection  COVID/SARS COV-2 PCR     Patient instructed to self-isolate/quarantine per CDC guidelines.  I will follow-up pending COVID-19 testing. Discussed with patient may obtain hard copy of results on [facility].   Advised patient symptoms are most likely viral in etiology. Increased fluids and rest. Discussed use of OTC cough and cold medication and Tylenol/Motrin for symptomatic relief.  Return for reevaluation or proceed to ED if symptoms persist or worsen. Supportive care, differential diagnoses, and indications for immediate follow-up discussed with patient. Patient should to proceed to ED for development of symptoms including but not limited to shortness of breath breath, difficulty breathing, or worsening symptoms not manageable at home.   The patient demonstrated a good understanding and agreed with the treatment plan and has no further questions regarding care.   Vital signs stable, patient in no acute respiratory distress.  Discussed with patient at length importance of communal effort to help decrease the infection rate of COVID 19. Patient advised to avoid large gatherings of people and practice good hand hygiene and respiratory precautions. COVID-19 discharge instructions and CDC guidelines provided to patient in AVS.      This patient is evaluated under [facility] isolation protocols in urgent care.  Out of an abundance of caution I am wearing a N95 mask, protective eye gear, gloves and gown through all interaction with patient.    Please note that this dictation was created using voice recognition software. I have made a reasonable attempt to correct obvious errors, but I expect that there are errors of grammar and possibly content that I did not discover before  finalizing the note.    This note was electronically signed by Madeleine Grimaldo PA-C

## 2020-11-26 LAB
SARS-COV-2 RNA RESP QL NAA+PROBE: DETECTED
SPECIMEN SOURCE: ABNORMAL

## 2021-05-12 ENCOUNTER — OFFICE VISIT (OUTPATIENT)
Dept: MEDICAL GROUP | Facility: MEDICAL CENTER | Age: 21
End: 2021-05-12
Payer: COMMERCIAL

## 2021-05-12 ENCOUNTER — HOSPITAL ENCOUNTER (OUTPATIENT)
Facility: MEDICAL CENTER | Age: 21
End: 2021-05-12
Attending: PHYSICIAN ASSISTANT
Payer: COMMERCIAL

## 2021-05-12 VITALS
SYSTOLIC BLOOD PRESSURE: 116 MMHG | WEIGHT: 279.8 LBS | OXYGEN SATURATION: 93 % | BODY MASS INDEX: 40.06 KG/M2 | HEART RATE: 89 BPM | DIASTOLIC BLOOD PRESSURE: 76 MMHG | HEIGHT: 70 IN | TEMPERATURE: 98.8 F

## 2021-05-12 DIAGNOSIS — Z00.00 WELLNESS EXAMINATION: ICD-10-CM

## 2021-05-12 DIAGNOSIS — E66.01 MORBID OBESITY (HCC): ICD-10-CM

## 2021-05-12 DIAGNOSIS — R82.90 ABNORMAL URINE: ICD-10-CM

## 2021-05-12 DIAGNOSIS — R31.29 OTHER MICROSCOPIC HEMATURIA: ICD-10-CM

## 2021-05-12 DIAGNOSIS — R10.12 LUQ PAIN: ICD-10-CM

## 2021-05-12 PROBLEM — Z78.9 VEGETARIAN DIET: Status: RESOLVED | Noted: 2018-11-28 | Resolved: 2021-05-12

## 2021-05-12 PROBLEM — E66.09 CLASS 2 OBESITY DUE TO EXCESS CALORIES WITHOUT SERIOUS COMORBIDITY WITH BODY MASS INDEX (BMI) OF 37.0 TO 37.9 IN ADULT: Status: RESOLVED | Noted: 2018-11-28 | Resolved: 2021-05-12

## 2021-05-12 PROBLEM — E66.812 CLASS 2 OBESITY DUE TO EXCESS CALORIES WITHOUT SERIOUS COMORBIDITY WITH BODY MASS INDEX (BMI) OF 37.0 TO 37.9 IN ADULT: Status: RESOLVED | Noted: 2018-11-28 | Resolved: 2021-05-12

## 2021-05-12 LAB
APPEARANCE UR: CLEAR
BILIRUB UR STRIP-MCNC: NEGATIVE MG/DL
COLOR UR AUTO: NORMAL
GLUCOSE UR STRIP.AUTO-MCNC: NEGATIVE MG/DL
KETONES UR STRIP.AUTO-MCNC: NEGATIVE MG/DL
LEUKOCYTE ESTERASE UR QL STRIP.AUTO: NEGATIVE
NITRITE UR QL STRIP.AUTO: NEGATIVE
PH UR STRIP.AUTO: 5.5 [PH] (ref 5–8)
PROT UR QL STRIP: NEGATIVE MG/DL
RBC UR QL AUTO: NORMAL
SP GR UR STRIP.AUTO: 1.03
UROBILINOGEN UR STRIP-MCNC: 0.2 MG/DL

## 2021-05-12 PROCEDURE — 87086 URINE CULTURE/COLONY COUNT: CPT

## 2021-05-12 PROCEDURE — 81002 URINALYSIS NONAUTO W/O SCOPE: CPT | Performed by: PHYSICIAN ASSISTANT

## 2021-05-12 PROCEDURE — 99213 OFFICE O/P EST LOW 20 MIN: CPT | Performed by: PHYSICIAN ASSISTANT

## 2021-05-12 RX ORDER — BUSPIRONE HYDROCHLORIDE 10 MG/1
10 TABLET ORAL 2 TIMES DAILY
COMMUNITY
End: 2023-01-05

## 2021-05-12 ASSESSMENT — PATIENT HEALTH QUESTIONNAIRE - PHQ9
1. LITTLE INTEREST OR PLEASURE IN DOING THINGS: MORE THAN HALF THE DAYS
9. THOUGHTS THAT YOU WOULD BE BETTER OFF DEAD, OR OF HURTING YOURSELF: NOT AT ALL
SUM OF ALL RESPONSES TO PHQ QUESTIONS 1-9: 20
3. TROUBLE FALLING OR STAYING ASLEEP OR SLEEPING TOO MUCH: NEARLY EVERY DAY
5. POOR APPETITE OR OVEREATING: NEARLY EVERY DAY
7. TROUBLE CONCENTRATING ON THINGS, SUCH AS READING THE NEWSPAPER OR WATCHING TELEVISION: NEARLY EVERY DAY
6. FEELING BAD ABOUT YOURSELF - OR THAT YOU ARE A FAILURE OR HAVE LET YOURSELF OR YOUR FAMILY DOWN: NEARLY EVERY DAY
8. MOVING OR SPEAKING SO SLOWLY THAT OTHER PEOPLE COULD HAVE NOTICED. OR THE OPPOSITE, BEING SO FIGETY OR RESTLESS THAT YOU HAVE BEEN MOVING AROUND A LOT MORE THAN USUAL: NOT AT ALL
4. FEELING TIRED OR HAVING LITTLE ENERGY: NEARLY EVERY DAY
2. FEELING DOWN, DEPRESSED, IRRITABLE, OR HOPELESS: NEARLY EVERY DAY
SUM OF ALL RESPONSES TO PHQ9 QUESTIONS 1 AND 2: 5

## 2021-05-12 ASSESSMENT — FIBROSIS 4 INDEX: FIB4 SCORE: 0.18

## 2021-05-12 NOTE — ASSESSMENT & PLAN NOTE
2 weeks very mild symptoms  Tired over the weekend  Tuesday sharp pain. LUQ. Standing up. Lasted about 5 minutes as a sharp pain then started getting dull. Now feels a little pain if he pushes on it but it isn't sharp. Nothing makes it worse. Nothing makes it feel better. Associated with nausea when the pain was sharp and then the rest of the day. Also felt dizzy and like he was going to pass out.   Did a teledoc visit he thought maybe it was mono.   H/o hematuria that wasn't fully worked up.  Currently no hematuria but has noticed the stream of urination has been different for a year.

## 2021-05-14 NOTE — PROGRESS NOTES
"Subjective:   Amish Curry is a 21 y.o. male here today for LUQ pain    LUQ pain  2 weeks very mild symptoms  Tired over the weekend  Tuesday sharp pain. LUQ. Standing up. Lasted about 5 minutes as a sharp pain then started getting dull. Now feels a little pain if he pushes on it but it isn't sharp. Nothing makes it worse. Nothing makes it feel better. Associated with nausea when the pain was sharp and then the rest of the day. Also felt dizzy and like he was going to pass out.   Did a teledoc visit he thought maybe it was mono.   H/o hematuria that wasn't fully worked up.  Currently no hematuria but has noticed the stream of urination has been different for a year.    Morbid obesity (HCC)  Gaining weight, not really working on weight loss       Current medicines (including changes today)  Current Outpatient Medications   Medication Sig Dispense Refill   • busPIRone (BUSPAR) 10 MG Tab tablet Take 10 mg by mouth 2 times a day.     • sertraline (ZOLOFT) 50 MG Tab Take 50 mg by mouth every day.       No current facility-administered medications for this visit.     He  has a past medical history of Allergy, Blurred vision, Breath shortness, Headache, Other specified disorder of intestines, and Pain.    ROS   No fever/chills. No weight change. No headache/dizziness. No focal weakness. No sore throat, nasal congestion, ear pain. No chest pain, no shortness of breath, difficulty breathing. No rash or skin lesion. No joint pain or swelling.       Objective:     /76 (BP Location: Left arm, Patient Position: Sitting, BP Cuff Size: Adult long)   Pulse 89   Temp 37.1 °C (98.8 °F) (Temporal)   Ht 1.778 m (5' 10\")   Wt (!) 127 kg (279 lb 12.8 oz)   SpO2 93%  Body mass index is 40.15 kg/m².   Physical Exam:  Constitutional: WDWN, NAD  Skin: Warm, dry, good turgor, no rashes in visible areas.  Respiratory: Unlabored respiratory effort, lungs clear to auscultation, no wheezes, no ronchi.  Cardiovascular: Normal " S1, S2, no murmur, no leg edema.  Abdomen: Soft, mild ttp LUQ  Psych: Alert and oriented x3, normal affect and mood.    Lab Results   Component Value Date/Time    POCCOLOR Dark Yellow 05/12/2021 03:50 PM    POCAPPEAR Clear 05/12/2021 03:50 PM    POCLEUKEST Negative 05/12/2021 03:50 PM    POCNITRITE Negative 05/12/2021 03:50 PM    POCUROBILIGE 0.2 05/12/2021 03:50 PM    POCPROTEIN Negative 05/12/2021 03:50 PM    POCURPH 5.5 05/12/2021 03:50 PM    POCBLOOD 2+ 05/12/2021 03:50 PM    POCSPGRV 1.030 05/12/2021 03:50 PM    POCKETONES Negative 05/12/2021 03:50 PM    POCBILIRUBIN Negative 05/12/2021 03:50 PM    POCGLUCUA Negative 05/12/2021 03:50 PM        Assessment and Plan:   The following treatment plan was discussed    1. Morbid obesity (HCC)      2. LUQ pain    - POCT Urinalysis  - US-RENAL; Future  - REFERRAL TO UROLOGY  - URINE CULTURE(NEW); Future    3. Abnormal urine    - POCT Urinalysis  - US-RENAL; Future  - REFERRAL TO UROLOGY  - URINE CULTURE(NEW); Future    4. Other microscopic hematuria    - REFERRAL TO UROLOGY  - URINE CULTURE(NEW); Future    5. Wellness examination    - CBC WITH DIFFERENTIAL; Future  - Comp Metabolic Panel; Future      Followup: after labs and imaging

## 2021-05-15 LAB
BACTERIA UR CULT: NORMAL
SIGNIFICANT IND 70042: NORMAL
SITE SITE: NORMAL
SOURCE SOURCE: NORMAL

## 2021-05-19 ENCOUNTER — HOSPITAL ENCOUNTER (OUTPATIENT)
Dept: LAB | Facility: MEDICAL CENTER | Age: 21
End: 2021-05-19
Attending: PHYSICIAN ASSISTANT
Payer: COMMERCIAL

## 2021-05-19 DIAGNOSIS — Z00.00 WELLNESS EXAMINATION: ICD-10-CM

## 2021-05-19 LAB
ALBUMIN SERPL BCP-MCNC: 4.2 G/DL (ref 3.2–4.9)
ALBUMIN/GLOB SERPL: 1.3 G/DL
ALP SERPL-CCNC: 100 U/L (ref 30–99)
ALT SERPL-CCNC: 17 U/L (ref 2–50)
ANION GAP SERPL CALC-SCNC: 11 MMOL/L (ref 7–16)
AST SERPL-CCNC: 14 U/L (ref 12–45)
BASOPHILS # BLD AUTO: 0.6 % (ref 0–1.8)
BASOPHILS # BLD: 0.07 K/UL (ref 0–0.12)
BILIRUB SERPL-MCNC: 0.4 MG/DL (ref 0.1–1.5)
BUN SERPL-MCNC: 14 MG/DL (ref 8–22)
CALCIUM SERPL-MCNC: 9.4 MG/DL (ref 8.4–10.2)
CHLORIDE SERPL-SCNC: 101 MMOL/L (ref 96–112)
CO2 SERPL-SCNC: 24 MMOL/L (ref 20–33)
CREAT SERPL-MCNC: 0.62 MG/DL (ref 0.5–1.4)
EOSINOPHIL # BLD AUTO: 0.06 K/UL (ref 0–0.51)
EOSINOPHIL NFR BLD: 0.5 % (ref 0–6.9)
ERYTHROCYTE [DISTWIDTH] IN BLOOD BY AUTOMATED COUNT: 43.2 FL (ref 35.9–50)
GLOBULIN SER CALC-MCNC: 3.2 G/DL (ref 1.9–3.5)
GLUCOSE SERPL-MCNC: 105 MG/DL (ref 65–99)
HCT VFR BLD AUTO: 48.1 % (ref 42–52)
HGB BLD-MCNC: 15.7 G/DL (ref 14–18)
IMM GRANULOCYTES # BLD AUTO: 0.06 K/UL (ref 0–0.11)
IMM GRANULOCYTES NFR BLD AUTO: 0.5 % (ref 0–0.9)
LYMPHOCYTES # BLD AUTO: 3.29 K/UL (ref 1–4.8)
LYMPHOCYTES NFR BLD: 28.1 % (ref 22–41)
MCH RBC QN AUTO: 28.8 PG (ref 27–33)
MCHC RBC AUTO-ENTMCNC: 32.6 G/DL (ref 33.7–35.3)
MCV RBC AUTO: 88.3 FL (ref 81.4–97.8)
MONOCYTES # BLD AUTO: 0.89 K/UL (ref 0–0.85)
MONOCYTES NFR BLD AUTO: 7.6 % (ref 0–13.4)
NEUTROPHILS # BLD AUTO: 7.32 K/UL (ref 1.82–7.42)
NEUTROPHILS NFR BLD: 62.7 % (ref 44–72)
NRBC # BLD AUTO: 0 K/UL
NRBC BLD-RTO: 0 /100 WBC
PLATELET # BLD AUTO: 326 K/UL (ref 164–446)
PMV BLD AUTO: 9.6 FL (ref 9–12.9)
POTASSIUM SERPL-SCNC: 3.9 MMOL/L (ref 3.6–5.5)
PROT SERPL-MCNC: 7.4 G/DL (ref 6–8.2)
RBC # BLD AUTO: 5.45 M/UL (ref 4.7–6.1)
SODIUM SERPL-SCNC: 136 MMOL/L (ref 135–145)
WBC # BLD AUTO: 11.7 K/UL (ref 4.8–10.8)

## 2021-05-19 PROCEDURE — 80053 COMPREHEN METABOLIC PANEL: CPT

## 2021-05-19 PROCEDURE — 85025 COMPLETE CBC W/AUTO DIFF WBC: CPT

## 2021-05-19 PROCEDURE — 36415 COLL VENOUS BLD VENIPUNCTURE: CPT

## 2021-05-30 ENCOUNTER — APPOINTMENT (OUTPATIENT)
Dept: RADIOLOGY | Facility: MEDICAL CENTER | Age: 21
End: 2021-05-30
Attending: PHYSICIAN ASSISTANT
Payer: COMMERCIAL

## 2021-10-05 ENCOUNTER — OFFICE VISIT (OUTPATIENT)
Dept: MEDICAL GROUP | Facility: MEDICAL CENTER | Age: 21
End: 2021-10-05
Payer: COMMERCIAL

## 2021-10-05 VITALS
HEART RATE: 90 BPM | WEIGHT: 295.4 LBS | RESPIRATION RATE: 20 BRPM | HEIGHT: 70 IN | OXYGEN SATURATION: 95 % | BODY MASS INDEX: 42.29 KG/M2 | DIASTOLIC BLOOD PRESSURE: 72 MMHG | SYSTOLIC BLOOD PRESSURE: 112 MMHG | TEMPERATURE: 98.2 F

## 2021-10-05 DIAGNOSIS — R07.0 CHRONIC THROAT PAIN: ICD-10-CM

## 2021-10-05 DIAGNOSIS — R07.0 THROAT PAIN: ICD-10-CM

## 2021-10-05 DIAGNOSIS — R31.9 HEMATURIA, UNSPECIFIED TYPE: ICD-10-CM

## 2021-10-05 DIAGNOSIS — H92.03 OTALGIA OF BOTH EARS: ICD-10-CM

## 2021-10-05 DIAGNOSIS — F32.1 CURRENT MODERATE EPISODE OF MAJOR DEPRESSIVE DISORDER, UNSPECIFIED WHETHER RECURRENT (HCC): ICD-10-CM

## 2021-10-05 DIAGNOSIS — G89.29 CHRONIC PAIN OF BOTH EARS: ICD-10-CM

## 2021-10-05 DIAGNOSIS — H92.03 CHRONIC PAIN OF BOTH EARS: ICD-10-CM

## 2021-10-05 DIAGNOSIS — G89.29 CHRONIC THROAT PAIN: ICD-10-CM

## 2021-10-05 PROCEDURE — 99214 OFFICE O/P EST MOD 30 MIN: CPT | Performed by: PHYSICIAN ASSISTANT

## 2021-10-05 ASSESSMENT — ANXIETY QUESTIONNAIRES
7. FEELING AFRAID AS IF SOMETHING AWFUL MIGHT HAPPEN: NEARLY EVERY DAY
2. NOT BEING ABLE TO STOP OR CONTROL WORRYING: NEARLY EVERY DAY
6. BECOMING EASILY ANNOYED OR IRRITABLE: NEARLY EVERY DAY
4. TROUBLE RELAXING: NEARLY EVERY DAY
IF YOU CHECKED OFF ANY PROBLEMS ON THIS QUESTIONNAIRE, HOW DIFFICULT HAVE THESE PROBLEMS MADE IT FOR YOU TO DO YOUR WORK, TAKE CARE OF THINGS AT HOME, OR GET ALONG WITH OTHER PEOPLE: NOT DIFFICULT AT ALL
5. BEING SO RESTLESS THAT IT IS HARD TO SIT STILL: NEARLY EVERY DAY
3. WORRYING TOO MUCH ABOUT DIFFERENT THINGS: NEARLY EVERY DAY
1. FEELING NERVOUS, ANXIOUS, OR ON EDGE: NEARLY EVERY DAY
GAD7 TOTAL SCORE: 21

## 2021-10-05 ASSESSMENT — FIBROSIS 4 INDEX: FIB4 SCORE: 0.23

## 2021-10-05 ASSESSMENT — PATIENT HEALTH QUESTIONNAIRE - PHQ9
5. POOR APPETITE OR OVEREATING: 3 - NEARLY EVERY DAY
SUM OF ALL RESPONSES TO PHQ QUESTIONS 1-9: 24
CLINICAL INTERPRETATION OF PHQ2 SCORE: 5

## 2021-10-05 NOTE — ASSESSMENT & PLAN NOTE
States he weaned himself off of medication. High score on screening today.    Depression Screening    Little interest or pleasure in doing things?  2 - more than half the days   Feeling down, depressed , or hopeless? 3 - nearly every day   Trouble falling or staying asleep, or sleeping too much?  3 - nearly every day   Feeling tired or having little energy?  3 - nearly every day   Poor appetite or overeating?  3 - nearly every day   Feeling bad about yourself - or that you are a failure or have let yourself or your family down? 3 - nearly every day   Trouble concentrating on things, such as reading the newspaper or watching television? 3 - nearly every day   Moving or speaking so slowly that other people could have noticed.  Or the opposite - being so fidgety or restless that you have been moving around a lot more than usual?  3 - nearly every day   Thoughts that you would be better off dead, or of hurting yourself?  1 - several days   Patient Health Questionnaire Score: 24       If depressive symptoms identified deferred to follow up visit unless specifically addressed in assesment and plan.    Interpretation of PHQ-9 Total Score   Score Severity   1-4 No Depression   5-9 Mild Depression   10-14 Moderate Depression   15-19 Moderately Severe Depression   20-27 Severe Depression    Has self harm thoughts from time to time but no plan - lives with mom and brothers, no fire arms. No h/o self harm or attempted suicide.     Has therapist and psychiatrist, planning to restart

## 2021-10-05 NOTE — ASSESSMENT & PLAN NOTE
Chronic problem. Thought maybe related to teeth or jaw. Knows that he grinds his teeth at night.   [place] teeth removed April 2021 - ear pain better temporarily - still had ear aches but not as back  Dr. Janny Kebede, [facility] - Invisalign for about 3 months - feels it has made it worse  Dr. Kebede mentioned getting mouth guard  Patient knows he has h/o ear infections so also wanted ot get the ears checked  Discussed TMJ treatments including soft foods, gentle massage, heat/cold compresses

## 2021-10-05 NOTE — ASSESSMENT & PLAN NOTE
Chronic, mild soreness in the back of the throat in the morning - will last a couple of days then come back awhile later. Currently it has lasted about 2 weeks. Sometimes hoarse voice. Does snore. Sometimes has reflux issues.

## 2021-10-05 NOTE — ASSESSMENT & PLAN NOTE
>>ASSESSMENT AND PLAN FOR MAJOR DEPRESSIVE DISORDER WRITTEN ON 10/5/2021  2:27 PM BY GREGORY CARRERA P.A.-C.    States he weaned himself off of medication. High score on screening today.    Depression Screening    Little interest or pleasure in doing things?  2 - more than half the days   Feeling down, depressed , or hopeless? 3 - nearly every day   Trouble falling or staying asleep, or sleeping too much?  3 - nearly every day   Feeling tired or having little energy?  3 - nearly every day   Poor appetite or overeating?  3 - nearly every day   Feeling bad about yourself - or that you are a failure or have let yourself or your family down? 3 - nearly every day   Trouble concentrating on things, such as reading the newspaper or watching television? 3 - nearly every day   Moving or speaking so slowly that other people could have noticed.  Or the opposite - being so fidgety or restless that you have been moving around a lot more than usual?  3 - nearly every day   Thoughts that you would be better off dead, or of hurting yourself?  1 - several days   Patient Health Questionnaire Score: 24       If depressive symptoms identified deferred to follow up visit unless specifically addressed in assesment and plan.    Interpretation of PHQ-9 Total Score   Score Severity   1-4 No Depression   5-9 Mild Depression   10-14 Moderate Depression   15-19 Moderately Severe Depression   20-27 Severe Depression    Has self harm thoughts from time to time but no plan - lives with mom and brothers, no fire arms. No h/o self harm or attempted suicide.     Has therapist and psychiatrist, planning to restart

## 2021-10-06 NOTE — PROGRESS NOTES
Subjective:   Amish Curry is a 21 y.o. male here today for concern about recurrent throat and ear pain    Chief Complaint   Patient presents with   • Other     has ongoing ear and throat pain; wisdom teeth; invisaline    • Other     Ongoing GI symptoms; stomach issues;ongoing left side pain renal will be done next week        Major depressive disorder  States he weaned himself off of medication. High score on screening today.    Depression Screening    Little interest or pleasure in doing things?  2 - more than half the days   Feeling down, depressed , or hopeless? 3 - nearly every day   Trouble falling or staying asleep, or sleeping too much?  3 - nearly every day   Feeling tired or having little energy?  3 - nearly every day   Poor appetite or overeating?  3 - nearly every day   Feeling bad about yourself - or that you are a failure or have let yourself or your family down? 3 - nearly every day   Trouble concentrating on things, such as reading the newspaper or watching television? 3 - nearly every day   Moving or speaking so slowly that other people could have noticed.  Or the opposite - being so fidgety or restless that you have been moving around a lot more than usual?  3 - nearly every day   Thoughts that you would be better off dead, or of hurting yourself?  1 - several days   Patient Health Questionnaire Score: 24       If depressive symptoms identified deferred to follow up visit unless specifically addressed in assesment and plan.    Interpretation of PHQ-9 Total Score   Score Severity   1-4 No Depression   5-9 Mild Depression   10-14 Moderate Depression   15-19 Moderately Severe Depression   20-27 Severe Depression    Has self harm thoughts from time to time but no plan - lives with mom and brothers, no fire arms. No h/o self harm or attempted suicide.     Has therapist and psychiatrist, planning to restart    Otalgia of both ears  Chronic problem. Thought maybe related to teeth or jaw. Knows  "that he grinds his teeth at night.   [place] teeth removed April 2021 - ear pain better temporarily - still had ear aches but not as back  Dr. Janny Kebede, [facility] - Invisalign for about 3 months - feels it has made it worse  Dr. Kebede mentioned getting mouth guard  Patient knows he has h/o ear infections so also wanted ot get the ears checked  Discussed TMJ treatments including soft foods, gentle massage, heat/cold compresses    Throat pain  Chronic, mild soreness in the back of the throat in the morning - will last a couple of days then come back awhile later. Currently it has lasted about 2 weeks. Sometimes hoarse voice. Does snore. Sometimes has reflux issues.     Hematuria  Has renal ultrasound scheduled       Current medicines (including changes today)  Current Outpatient Medications   Medication Sig Dispense Refill   • busPIRone (BUSPAR) 10 MG Tab tablet Take 10 mg by mouth 2 times a day. (Patient not taking: Reported on 10/5/2021)     • sertraline (ZOLOFT) 50 MG Tab Take 50 mg by mouth every day. (Patient not taking: Reported on 10/5/2021)       No current facility-administered medications for this visit.     He  has a past medical history of Allergy, Blurred vision, Breath shortness, Headache, Other specified disorder of intestines, and Pain.    ROS   No fever/chills.  No headache/dizziness. No focal weakness.  No chest pain, no shortness of breath, difficulty breathing. No n/v/d/c or abdominal pain. No urinary complaint. No rash or skin lesion. No joint pain or swelling.       Objective:     /72 (BP Location: Left arm, Patient Position: Sitting, BP Cuff Size: Adult long)   Pulse 90   Temp 36.8 °C (98.2 °F) (Temporal)   Resp 20   Ht 1.778 m (5' 10\")   Wt (!) 134 kg (295 lb 6.4 oz)   SpO2 95%  Body mass index is 42.39 kg/m².   Physical Exam:  Constitutional: WDWN, NAD  Skin: Warm, dry, good turgor, no rashes in visible areas.  Eye: Equal, round and reactive, conjunctiva clear, lids " normal.  ENMT: Lips without lesions, good dentition, oropharynx clear.  Neck: Trachea midline, no masses, no thyromegaly. No cervical or supraclavicular lymphadenopathy  Respiratory: Unlabored respiratory effort, lungs clear to auscultation, no wheezes, no ronchi.  Cardiovascular: Normal S1, S2, no murmur, no leg edema.  Psych: Alert and oriented x3, normal affect and mood.      Assessment and Plan:   The following treatment plan was discussed    1. Current moderate episode of major depressive disorder, unspecified whether recurrent (HCC)    - Patient has been identified as having a positive depression screening. Appropriate orders and counseling have been given.    2. Otalgia of both ears      3. Throat pain      4. Chronic pain of both ears    - REFERRAL TO ENT    5. Chronic throat pain    - REFERRAL TO ENT    6. Hematuria, unspecified type        Followup: after ultrasound. Discussed TMJ with dentist. Supportive care discussed. Patient requests ENT referral for ears and throat

## 2021-10-12 ENCOUNTER — HOSPITAL ENCOUNTER (OUTPATIENT)
Dept: RADIOLOGY | Facility: MEDICAL CENTER | Age: 21
End: 2021-10-12
Attending: PHYSICIAN ASSISTANT
Payer: COMMERCIAL

## 2021-10-12 DIAGNOSIS — R82.90 ABNORMAL URINE: ICD-10-CM

## 2021-10-12 DIAGNOSIS — R10.12 LUQ PAIN: ICD-10-CM

## 2021-10-12 PROCEDURE — 76775 US EXAM ABDO BACK WALL LIM: CPT

## 2022-03-25 ENCOUNTER — OFFICE VISIT (OUTPATIENT)
Dept: MEDICAL GROUP | Facility: MEDICAL CENTER | Age: 22
End: 2022-03-25
Payer: COMMERCIAL

## 2022-03-25 VITALS
HEART RATE: 98 BPM | DIASTOLIC BLOOD PRESSURE: 80 MMHG | WEIGHT: 310 LBS | BODY MASS INDEX: 44.38 KG/M2 | TEMPERATURE: 97.7 F | HEIGHT: 70 IN | SYSTOLIC BLOOD PRESSURE: 138 MMHG | OXYGEN SATURATION: 96 %

## 2022-03-25 DIAGNOSIS — F33.1 MODERATE EPISODE OF RECURRENT MAJOR DEPRESSIVE DISORDER (HCC): ICD-10-CM

## 2022-03-25 DIAGNOSIS — Z83.3 FAMILY HISTORY OF DIABETES MELLITUS TYPE II: ICD-10-CM

## 2022-03-25 DIAGNOSIS — Z13.6 SCREENING FOR CARDIOVASCULAR CONDITION: ICD-10-CM

## 2022-03-25 DIAGNOSIS — R63.5 WEIGHT GAIN: ICD-10-CM

## 2022-03-25 DIAGNOSIS — M26.609 TMJ (TEMPOROMANDIBULAR JOINT SYNDROME): ICD-10-CM

## 2022-03-25 DIAGNOSIS — K58.2 IRRITABLE BOWEL SYNDROME WITH BOTH CONSTIPATION AND DIARRHEA: ICD-10-CM

## 2022-03-25 DIAGNOSIS — Z76.89 SLEEP CONCERN: ICD-10-CM

## 2022-03-25 DIAGNOSIS — E66.01 MORBID OBESITY (HCC): ICD-10-CM

## 2022-03-25 DIAGNOSIS — R40.0 DAYTIME SLEEPINESS: ICD-10-CM

## 2022-03-25 DIAGNOSIS — Z00.00 WELLNESS EXAMINATION: ICD-10-CM

## 2022-03-25 PROBLEM — H53.9 ABNORMAL VISION: Status: RESOLVED | Noted: 2018-11-28 | Resolved: 2022-03-25

## 2022-03-25 PROBLEM — R07.0 THROAT PAIN: Status: RESOLVED | Noted: 2021-10-05 | Resolved: 2022-03-25

## 2022-03-25 PROBLEM — R31.9 HEMATURIA: Status: RESOLVED | Noted: 2020-01-13 | Resolved: 2022-03-25

## 2022-03-25 PROBLEM — H92.03 OTALGIA OF BOTH EARS: Status: RESOLVED | Noted: 2021-10-05 | Resolved: 2022-03-25

## 2022-03-25 PROCEDURE — 99214 OFFICE O/P EST MOD 30 MIN: CPT | Performed by: PHYSICIAN ASSISTANT

## 2022-03-25 RX ORDER — SERTRALINE HYDROCHLORIDE 25 MG/1
25 TABLET, FILM COATED ORAL DAILY
COMMUNITY
Start: 2022-01-20 | End: 2023-01-05

## 2022-03-25 RX ORDER — ATOMOXETINE 18 MG/1
18 CAPSULE ORAL DAILY
COMMUNITY
Start: 2022-01-21 | End: 2023-01-05

## 2022-03-25 ASSESSMENT — FIBROSIS 4 INDEX: FIB4 SCORE: 0.23

## 2022-03-25 ASSESSMENT — PATIENT HEALTH QUESTIONNAIRE - PHQ9: CLINICAL INTERPRETATION OF PHQ2 SCORE: 0

## 2022-03-25 NOTE — ASSESSMENT & PLAN NOTE
Chronic, worse with spicey foods, fried foods, dairy. Stomach discomfort, diarrhea/constipation, bloating. No blood in stool. No personal or family history of inflammatory bowel disease.

## 2022-03-25 NOTE — PROGRESS NOTES
"Subjective:   Amish Curry is a 21 y.o. male here today for follow up on chronic conditions    Chief Complaint   Patient presents with   • Sleep Problem     Questions of sleep apnea        Moderate episode of recurrent major depressive disorder (HCC)  Seeing psychiatrist, having depression and anxiety and concentration difficulty, feeling better on current medications     TMJ (temporomandibular joint syndrome)  Likely the cause of his ear pain, will discuss mouth guard with dentist    Sleep concern  Snoring and daytime sleepiness, concerned about sleep apnea and his mom has diagnosed with ROXANE    Irritable bowel syndrome with both constipation and diarrhea  Chronic, worse with spicey foods, fried foods, dairy. Stomach discomfort, diarrhea/constipation, bloating. No blood in stool. No personal or family history of inflammatory bowel disease.       Current medicines (including changes today)  Current Outpatient Medications   Medication Sig Dispense Refill   • atomoxetine (STRATTERA) 18 MG capsule Take 18 mg by mouth every day.     • sertraline (ZOLOFT) 25 MG tablet Take 25 mg by mouth every day. for 30 days     • busPIRone (BUSPAR) 10 MG Tab tablet Take 10 mg by mouth 2 times a day.       No current facility-administered medications for this visit.     He  has a past medical history of Allergy, Blurred vision, Breath shortness, Headache, Other specified disorder of intestines, and Pain.    ROS   No fever/chills.  No headache/dizziness. No focal weakness. No sore throat, nasal congestion, ear pain. No chest pain, no shortness of breath, difficulty breathing.No urinary complaint. No rash or skin lesion. No joint pain or swelling.       Objective:     /80 (BP Location: Right arm, Patient Position: Sitting, BP Cuff Size: Adult long)   Pulse 98   Temp 36.5 °C (97.7 °F) (Temporal)   Ht 1.778 m (5' 10\")   Wt (!) 141 kg (310 lb)   SpO2 96%  Body mass index is 44.48 kg/m².   Physical Exam:  Constitutional: " WDWN, NAD  Skin: Warm, dry, good turgor, no rashes in visible areas.  Psych: Alert and oriented x3, normal affect and mood.    Assessment and Plan:   The following treatment plan was discussed    1. Sleep concern    - Referral to Pulmonary and Sleep Medicine    2. Screening for cardiovascular condition    - Lipid Profile; Future    3. Wellness examination    - Comp Metabolic Panel; Future    4. Morbid obesity (HCC)    - Referral to Pulmonary and Sleep Medicine    5. Weight gain    - Referral to Pulmonary and Sleep Medicine  - TSH WITH REFLEX TO FT4; Future    6. Family history of diabetes mellitus type II    - HEMOGLOBIN A1C; Future    7. Daytime sleepiness    - Referral to Pulmonary and Sleep Medicine    8. Moderate episode of recurrent major depressive disorder (HCC)      9. TMJ (temporomandibular joint syndrome)      10. Irritable bowel syndrome with both constipation and diarrhea        Followup: Return in about 2 weeks (around 4/8/2022).

## 2022-03-25 NOTE — ASSESSMENT & PLAN NOTE
Seeing psychiatrist, having depression and anxiety and concentration difficulty, feeling better on current medications

## 2022-03-25 NOTE — PATIENT INSTRUCTIONS
Irritable Bowel Syndrome, Adult    Irritable bowel syndrome (IBS) is a group of symptoms that affects the organs responsible for digestion (gastrointestinal or GI tract). IBS is not one specific disease.  To regulate how the GI tract works, the body sends signals back and forth between the intestines and the brain. If you have IBS, there may be a problem with these signals. As a result, the GI tract does not function normally. The intestines may become more sensitive and overreact to certain things. This may be especially true when you eat certain foods or when you are under stress.  There are four types of IBS. These may be determined based on the consistency of your stool (feces):  · IBS with diarrhea.  · IBS with constipation.  · Mixed IBS.  · Unsubtyped IBS.  It is important to know which type of IBS you have. Certain treatments are more likely to be helpful for certain types of IBS.  What are the causes?  The exact cause of IBS is not known.  What increases the risk?  You may have a higher risk for IBS if you:  · Are female.  · Are younger than 40.  · Have a family history of IBS.  · Have a mental health condition, such as depression, anxiety, or post-traumatic stress disorder.  · Have had a bacterial infection of your GI tract.  What are the signs or symptoms?  Symptoms of IBS vary from person to person. The main symptom is abdominal pain or discomfort. Other symptoms usually include one or more of the following:  · Diarrhea, constipation, or both.  · Abdominal swelling or bloating.  · Feeling full after eating a small or regular-sized meal.  · Frequent gas.  · Mucus in the stool.  · A feeling of having more stool left after a bowel movement.  Symptoms tend to come and go. They may be triggered by stress, mental health conditions, or certain foods.  How is this diagnosed?  This condition may be diagnosed based on a physical exam, your medical history, and your symptoms. You may have tests, such as:  · Blood  tests.  · Stool test.  · X-rays.  · CT scan.  · Colonoscopy. This is a procedure in which your GI tract is viewed with a long, thin, flexible tube.  How is this treated?  There is no cure for IBS, but treatment can help relieve symptoms. Treatment depends on the type of IBS you have, and may include:  · Changes to your diet, such as:  ? Avoiding foods that cause symptoms.  ? Drinking more water.  ? Following a low-FODMAP (fermentable oligosaccharides, disaccharides, monosaccharides, and polyols) diet for up to 6 weeks, or as told by your health care provider. FODMAPs are sugars that are hard for some people to digest.  ? Eating more fiber.  ? Eating medium-sized meals at the same times every day.  · Medicines. These may include:  ? Fiber supplements, if you have constipation.  ? Medicine to control diarrhea (antidiarrheal medicines).  ? Medicine to help control muscle tightening (spasms) in your GI tract (antispasmodic medicines).  ? Medicines to help with mental health conditions, such as antidepressants or tranquilizers.  · Talk therapy or counseling.  · Working with a diet and nutrition specialist (dietitian) to help create a food plan that is right for you.  · Managing your stress.  Follow these instructions at home:  Eating and drinking  · Eat a healthy diet.  · Eat medium-sized meals at about the same time every day. Do not eat large meals.  · Gradually eat more fiber-rich foods. These include whole grains, fruits, and vegetables. This may be especially helpful if you have IBS with constipation.  · Eat a diet low in FODMAPs.  · Drink enough fluid to keep your urine pale yellow.  · Keep a journal of foods that seem to trigger symptoms.  · Avoid foods and drinks that:  ? Contain added sugar.  ? Make your symptoms worse. Dairy products, caffeinated drinks, and carbonated drinks can make symptoms worse for some people.  General instructions  · Take over-the-counter and prescription medicines and supplements only  as told by your health care provider.  · Get enough exercise. Do at least 150 minutes of moderate-intensity exercise each week.  · Manage your stress. Getting enough sleep and exercise can help you manage stress.  · Keep all follow-up visits as told by your health care provider and therapist. This is important.  Alcohol Use  · Do not drink alcohol if:  ? Your health care provider tells you not to drink.  ? You are pregnant, may be pregnant, or are planning to become pregnant.  · If you drink alcohol, limit how much you have:  ? 0-1 drink a day for women.  ? 0-2 drinks a day for men.  · Be aware of how much alcohol is in your drink. In the [place] one drink equals one typical bottle of beer (12 oz), one-half glass of wine (5 oz), or one shot of hard liquor (1½ oz).  Contact a health care provider if you have:  · Constant pain.  · Weight loss.  · Difficulty or pain when swallowing.  · Diarrhea that gets worse.  Get help right away if you have:  · Severe abdominal pain.  · Fever.  · Diarrhea with symptoms of dehydration, such as dizziness or dry mouth.  · Bright red blood in your stool.  · Stool that is black and tarry.  · Abdominal swelling.  · Vomiting that does not stop.  · Blood in your vomit.  Summary  · Irritable bowel syndrome (IBS) is not one specific disease. It is a group of symptoms that affects digestion.  · Your intestines may become more sensitive and overreact to certain things. This may be especially true when you eat certain foods or when you are under stress.  · There is no cure for IBS, but treatment can help relieve symptoms.  This information is not intended to replace advice given to you by your health care provider. Make sure you discuss any questions you have with your health care provider.  Document Released: 12/18/2006 Document Revised: 12/11/2018 Document Reviewed: 12/11/2018  Elsevier Patient Education © 2020 [employer]

## 2022-04-05 ENCOUNTER — HOSPITAL ENCOUNTER (OUTPATIENT)
Dept: LAB | Facility: MEDICAL CENTER | Age: 22
End: 2022-04-05
Attending: PHYSICIAN ASSISTANT
Payer: COMMERCIAL

## 2022-04-05 DIAGNOSIS — R63.5 WEIGHT GAIN: ICD-10-CM

## 2022-04-05 DIAGNOSIS — Z13.6 SCREENING FOR CARDIOVASCULAR CONDITION: ICD-10-CM

## 2022-04-05 DIAGNOSIS — Z00.00 WELLNESS EXAMINATION: ICD-10-CM

## 2022-04-05 DIAGNOSIS — Z83.3 FAMILY HISTORY OF DIABETES MELLITUS TYPE II: ICD-10-CM

## 2022-04-05 LAB
ALBUMIN SERPL BCP-MCNC: 4 G/DL (ref 3.2–4.9)
ALBUMIN/GLOB SERPL: 1.4 G/DL
ALP SERPL-CCNC: 99 U/L (ref 30–99)
ALT SERPL-CCNC: 14 U/L (ref 2–50)
ANION GAP SERPL CALC-SCNC: 12 MMOL/L (ref 7–16)
AST SERPL-CCNC: 8 U/L (ref 12–45)
BILIRUB SERPL-MCNC: 0.2 MG/DL (ref 0.1–1.5)
BUN SERPL-MCNC: 12 MG/DL (ref 8–22)
CALCIUM SERPL-MCNC: 9.4 MG/DL (ref 8.5–10.5)
CHLORIDE SERPL-SCNC: 101 MMOL/L (ref 96–112)
CHOLEST SERPL-MCNC: 175 MG/DL (ref 100–199)
CO2 SERPL-SCNC: 25 MMOL/L (ref 20–33)
CREAT SERPL-MCNC: 0.51 MG/DL (ref 0.5–1.4)
EST. AVERAGE GLUCOSE BLD GHB EST-MCNC: 103 MG/DL
FASTING STATUS PATIENT QL REPORTED: NORMAL
GFR SERPLBLD CREATININE-BSD FMLA CKD-EPI: 146 ML/MIN/1.73 M 2
GLOBULIN SER CALC-MCNC: 2.8 G/DL (ref 1.9–3.5)
GLUCOSE SERPL-MCNC: 88 MG/DL (ref 65–99)
HBA1C MFR BLD: 5.2 % (ref 4–5.6)
HDLC SERPL-MCNC: 47 MG/DL
LDLC SERPL CALC-MCNC: 113 MG/DL
POTASSIUM SERPL-SCNC: 4.2 MMOL/L (ref 3.6–5.5)
PROT SERPL-MCNC: 6.8 G/DL (ref 6–8.2)
SODIUM SERPL-SCNC: 138 MMOL/L (ref 135–145)
TRIGL SERPL-MCNC: 75 MG/DL (ref 0–149)
TSH SERPL DL<=0.005 MIU/L-ACNC: 4.16 UIU/ML (ref 0.38–5.33)

## 2022-04-05 PROCEDURE — 83036 HEMOGLOBIN GLYCOSYLATED A1C: CPT

## 2022-04-05 PROCEDURE — 80053 COMPREHEN METABOLIC PANEL: CPT

## 2022-04-05 PROCEDURE — 80061 LIPID PANEL: CPT

## 2022-04-05 PROCEDURE — 84443 ASSAY THYROID STIM HORMONE: CPT

## 2022-04-05 PROCEDURE — 36415 COLL VENOUS BLD VENIPUNCTURE: CPT

## 2022-04-08 ENCOUNTER — TELEMEDICINE (OUTPATIENT)
Dept: MEDICAL GROUP | Facility: MEDICAL CENTER | Age: 22
End: 2022-04-08
Payer: COMMERCIAL

## 2022-04-08 VITALS — HEIGHT: 70 IN | BODY MASS INDEX: 44.38 KG/M2 | WEIGHT: 310 LBS

## 2022-04-08 DIAGNOSIS — R73.03 PREDIABETES: ICD-10-CM

## 2022-04-08 DIAGNOSIS — E66.01 MORBID OBESITY (HCC): ICD-10-CM

## 2022-04-08 DIAGNOSIS — K58.2 IRRITABLE BOWEL SYNDROME WITH BOTH CONSTIPATION AND DIARRHEA: ICD-10-CM

## 2022-04-08 PROCEDURE — 99213 OFFICE O/P EST LOW 20 MIN: CPT | Mod: 95 | Performed by: PHYSICIAN ASSISTANT

## 2022-04-08 ASSESSMENT — FIBROSIS 4 INDEX: FIB4 SCORE: 0.15

## 2022-04-11 NOTE — PROGRESS NOTES
"Virtual Visit: Established Patient   This visit was conducted via Zoom using secure and encrypted videoconferencing technology.   The patient was in their home in the state of [place].    The patient's identity was confirmed and verbal consent was obtained for this virtual visit.    Subjective:   CC:   Chief Complaint   Patient presents with   • Lab Results     Follow up     Amish Curry is a 22 y.o. male presenting for evaluation and management of:    Irritable bowel syndrome with both constipation and diarrhea  Improved symptoms with diet changes    Prediabetes  A1C at 5.2, doing well on current    Morbid obesity (HCC)  Discussed diet and exercise      ROS no fever/chills. No headache/dizziness. No rash or skin lesion      Current medicines (including changes today)  Current Outpatient Medications   Medication Sig Dispense Refill   • atomoxetine (STRATTERA) 18 MG capsule Take 18 mg by mouth every day.     • sertraline (ZOLOFT) 25 MG tablet Take 25 mg by mouth every day. for 30 days     • busPIRone (BUSPAR) 10 MG Tab tablet Take 10 mg by mouth 2 times a day.       No current facility-administered medications for this visit.       Patient Active Problem List    Diagnosis Date Noted   • Sleep concern 03/25/2022   • Daytime sleepiness 03/25/2022   • Moderate episode of recurrent major depressive disorder (HCC) 03/25/2022   • TMJ (temporomandibular joint syndrome) 03/25/2022   • Morbid obesity (HCC) 05/12/2021   • Irritable bowel syndrome with both constipation and diarrhea 05/12/2021   • Disturbed concentration 01/24/2019   • Prediabetes 12/13/2018   • Major depressive disorder 11/28/2018   • History of eye surgery 11/28/2018   • Family history of diabetes mellitus type II 03/03/2015        Objective:   Ht 1.778 m (5' 10\")   Wt (!) 141 kg (310 lb) Comment: Pt reported  BMI 44.48 kg/m²     Physical Exam:  Constitutional: Alert, no distress, well-groomed.  Skin: No rashes in visible areas.  Eye: Round. " Conjunctiva clear, lids normal. No icterus.   ENMT: Lips pink without lesions, good dentition, moist mucous membranes. Phonation normal.  Neck: No masses, no thyromegaly. Moves freely without pain.  Respiratory: Unlabored respiratory effort, no cough or audible wheeze  Psych: Alert and oriented x3, normal affect and mood.     Assessment and Plan:   The following treatment plan was discussed:     1. Irritable bowel syndrome with both constipation and diarrhea    2. Prediabetes    3. Morbid obesity (HCC)      Follow-up: as needed, will establish with new PCP as I am leaving [facility]

## 2022-06-07 ASSESSMENT — ENCOUNTER SYMPTOMS: SLEEP DISTURBANCE: 0

## 2022-06-14 ENCOUNTER — OFFICE VISIT (OUTPATIENT)
Dept: SLEEP MEDICINE | Facility: MEDICAL CENTER | Age: 22
End: 2022-06-14
Payer: COMMERCIAL

## 2022-06-14 VITALS
WEIGHT: 305 LBS | OXYGEN SATURATION: 96 % | HEIGHT: 70 IN | BODY MASS INDEX: 43.67 KG/M2 | SYSTOLIC BLOOD PRESSURE: 130 MMHG | HEART RATE: 84 BPM | DIASTOLIC BLOOD PRESSURE: 68 MMHG

## 2022-06-14 DIAGNOSIS — F51.04 CHRONIC INSOMNIA: ICD-10-CM

## 2022-06-14 DIAGNOSIS — R06.83 LOUD SNORING: ICD-10-CM

## 2022-06-14 DIAGNOSIS — G47.00 FREQUENT NOCTURNAL AWAKENING: ICD-10-CM

## 2022-06-14 DIAGNOSIS — G47.19 EXCESSIVE DAYTIME SLEEPINESS: ICD-10-CM

## 2022-06-14 DIAGNOSIS — G47.30 SLEEP DISORDER BREATHING: Primary | ICD-10-CM

## 2022-06-14 PROCEDURE — 99204 OFFICE O/P NEW MOD 45 MIN: CPT | Performed by: STUDENT IN AN ORGANIZED HEALTH CARE EDUCATION/TRAINING PROGRAM

## 2022-06-14 ASSESSMENT — FIBROSIS 4 INDEX: FIB4 SCORE: 0.15

## 2022-06-14 NOTE — PROGRESS NOTES
[facility] Consult Note     Date: 6/14/2022 / Time: 2:04 PM      Thank you for requesting a sleep medicine consultation on Amish Curry at the sleep center. Presents today with the chief complaints of snoring, trouble falling asleep, frequent awakenings, daytime fatigue, occasional excessive daytime sleepiness. He is referred by Lilia Kern P.A.-C.  [address]  [address]  [place] [address] for evaluation and treatment of sleep disorder breathing.     HISTORY OF PRESENT ILLNESS:     Amish Curry is a 22 y.o. male with anxiety, depression and morbid obesity.  Presents to Sleep Clinic for evaluation of potential sleep apnea.    He reports that his sister was recently diagnosed with sleep apnea and this had him concerned that he may have sleep apnea as well.    For as long as he can remember he has had trouble with sleep.  And even as a child he had snoring and sleep.  His snoring has worsened over the last couple years.  He has awoken feeling that he is needing to catch his breath.    He does have trouble falling asleep.  It will take him longer than 30 minutes 3 to 4 days a week to fall asleep.  Once he is asleep he tends to awaken 3-5 times a night.  He tends to be able to get back to sleep without issue.    He is tired at times during the day.  Does feel that he has low energy and trouble concentrating at times.  On days off he will nap during the day anywhere between an hour to 4 hours.  He feels that even if he gets enough sleep he never feels rested.    As per supplemental questionnaire to be scanned or imported into chart:    [place] Sleepiness Score: 13    Sleep Schedule  Bedtime: Weekday 8pm-1am Weekend 10pm-12am  Wake time: Weekday 5am Weekend 6am  Sleep-onset latency: mins to hours. 3-4 days a week takes longer 30 min   Awakenings from sleep: 3-5  Difficulty falling back asleep: none   Bedroom partner: none   Naps: Yes, on days off     DAYTIME SYMPTOMS:  "  Excessive daytime sleepiness: Yes  Daytime fatigue: Yes  Difficulty concentrating: Yes  Memory problems: Yes  Irritability:Yes  Work/school performance issues: Yes  Sleepiness with driving: No   Caffeine/stimulant use: Yes, energy drink organic   Alcohol use:Yes, How Many? About every two weeks has a few drinks a night      SLEEP RELATED SYMPTOMS  Snoring: Yes  Witnessed apnea or gasping/choking: Yes, choking/gasping   Dry mouth or mouth breathing: Yes  Sweating: Yes  Teeth grinding/biting: Yes  Morning headaches: Yes  Refreshed Upon Awakening: No      SLEEP RELATED BEHAVIORS:  Parasomnias (walking, talking, eating, violence): Yes talking occasionally   Leg kicking: No   Restless legs - \"urge to move\": No   Nightmares: Yes Recurrent: No   Dream enactment: No      NARCOLEPSY:  Cataplexy: No   Sleep paralysis: Yes  Sleep attacks: No   Hypnagogic/hypnopompic hallucinations: Yes, voices and seeing things    MEDICAL HISTORY  Past Medical History:   Diagnosis Date   • Abdominal pain    • Allergy    • Apnea, sleep    • Back pain    • Back pain    • Blood in urine    • Blurred vision    • Breath shortness     sometimes with gall bladder attack   • Chills    • Constipation    • Daytime sleepiness    • Depression    • Diarrhea    • Dizziness    • Earache    • Eye pain    • Fatigue    • Frequent headaches    • Frequent urination    • Gasping for breath    • Headache    • Hoarseness, persistent    • Insomnia    • Morning headache    • Nausea    • Obesity    • Other specified disorder of intestines     constipation/diarrhea   • Pain     neck, back, chest, right upper abd   • Painful urination    • Rhinitis    • Ringing in ears    • Snoring    • Sore muscles    • Sore throat, chronic    • Toothache         SURGICAL HISTORY  Past Surgical History:   Procedure Laterality Date   • COURTNEY BY LAPAROSCOPY N/A 8/6/2015    Procedure: COURTNEY BY LAPAROSCOPY;  Surgeon: Roya Adams M.D.;  Location: SURGERY [facility];  Service:    • " "EYE MUSCLE REPAIR     • EYE SURGERY          FAMILY HISTORY  Family History   Problem Relation Age of Onset   • Hyperlipidemia Father    • Diabetes Maternal Grandmother    • Diabetes Paternal Grandmother    • Cancer Paternal Grandmother    • Stroke Paternal Grandfather    • Heart Attack Neg Hx    • Heart Disease Neg Hx        SOCIAL HISTORY  Social History     Socioeconomic History   • Marital status: Single   Tobacco Use   • Smoking status: Never Smoker   • Smokeless tobacco: Never Used   • Tobacco comment: Nicki smoked before but only out of curiosity not often if at all   Vaping Use   • Vaping Use: Never used   Substance and Sexual Activity   • Alcohol use: Yes     Comment: When I go out sometimes but that can mean I go weeks to [place]   • Drug use: No   • Sexual activity: Not Currently        Occupation: [employer]    CURRENT MEDICATIONS  Current Outpatient Medications   Medication Sig Dispense Refill   • atomoxetine (STRATTERA) 18 MG capsule Take 18 mg by mouth every day.     • sertraline (ZOLOFT) 25 MG tablet Take 25 mg by mouth every day. for 30 days     • busPIRone (BUSPAR) 10 MG Tab tablet Take 10 mg by mouth 2 times a day.       No current facility-administered medications for this visit.       REVIEW OF SYSTEMS  Constitutional: Denies fevers, Denies weight changes  Ears/Nose/Throat/Mouth: Denies nasal congestion or sore throat   Cardiovascular: Denies chest pain  Respiratory: Denies shortness of breath, Denies cough  Gastrointestinal/Hepatic: Denies nausea, vomiting  Sleep: see HPI    Physical Examination:  Vitals/ General Appearance:   Weight/BMI: Body mass index is 43.76 kg/m².  /68 (BP Location: Left arm, Patient Position: Sitting, BP Cuff Size: Large adult)   Pulse 84   Ht 1.778 m (5' 10\")   Wt (!) 138 kg (305 lb)   SpO2 96%   Vitals:    06/14/22 1405   BP: 130/68   BP Location: Left arm   Patient Position: Sitting   BP Cuff Size: Large adult   Pulse: " "84   SpO2: 96%   Weight: (!) 138 kg (305 lb)   Height: 1.778 m (5' 10\")       Pt. is alert and oriented to time, place and person. Cooperative and in no apparent distress.     Constitutional: Alert, no distress, well-groomed.  Skin: No rashes in visible areas.  Eye: Round. Conjunctiva clear, lids normal. No icterus.   ENT EXAM  Nasal alae/valves collapsible: No   Nasal septum deviation: Yes  Nasal turbinate hypertrophy: Left: Grade 1   Right: Grade 1  Hard palate narrow: No   Hard palate high: No   Soft palate/uvula (Mallampati score): 4  Tongue Scalloping: Yes  Retrognathia: No   Micrognathia: No   Cardiovascular:no murmus/gallops/rubs, normal S1 and S2 heart sounds, regular rate and rhythm  Pulmonary:Clear to auscultation, No wheezes, No crackles.  Neurologic:Awake, alert and oriented x 3, Normal age appropriate gait, No involuntary motions.  Extremities: No clubbing, cyanosis, or edema       ASSESSMENT AND PLAN   Amish Curry is a 22 y.o. male with anxiety, depression and morbid obesity.  Presents to Sleep Clinic for evaluation of potential sleep apnea.    1. Amish Curry  has symptoms of Obstructive Sleep Apnea (ROXANE). Amish Curry has symptoms of snoring, choking/gasping during sleep, dry mouth, morning headaches, unrefreshed upon awakening, night sweats, daytime tiredness, sleepiness. These  interfere with activities of daily living.   ESS 13  Pt has risk factors for ROXANE include obesity, family hx, and crowded oropharynx.     The pathophysiology of ROXANE and the increased risk of cardiovascular morbidity from untreated ROXANE is discussed in detail with the patient. He  also has Depression, OFE which can be worsened by ROXANE.      We have discussed diagnostic options including in-laboratory, attended polysomnography and home sleep testing. We have also discussed treatment options including airway pressurization, reconstructive otolaryngologic surgery, dental appliances and weight " management.     Subsequently,treatment options will be discussed based on the diagnostic study. Meanwhile, He is urged to avoid supine sleep, weight gain and alcoholic beverages since all of these can worsen ROXANE. He is cautioned against drowsy driving. If He feels sleepy while driving, advised must pull over for a break/nap, rather than persist on the road, in the interest of Pt's own safety and that of others on the road.    Studies have found that in younger healthy adults home sleep apnea testing can underestimate and have a higher false-negative rate versus in lab testing.  Based off of this he would benefit from undergoing a in lab sleep study.      Plan  -  He  will be scheduled for an overnight PSG to assess sleep related breathing disorder.  - Follow up 1-2 weeks after sleep study to discuss results and treatment options moving forward   -Advised to reach out via [employer] or by phone with any questions or concerns.     2.  Chronic Insomnia   With prolonged sleep latency, frequent awakeningsand feeling this is impacting daily functioning for years meets criteria for chronic insomnia. Discussed potential causes of insomnia and importance of having a routine around bedtime.     Suspect his insomnia may be worsened by his current work schedule along with having potential obstructive sleep apnea.  If sleep apnea is present this would likely improve his awakenings at night.  Discussed importance of trying to maintain a regular sleep schedule throughout the week.    RECOMMENDATIONS  -Maintain a regular sleep schedule  -Avoid caffeinated beverages after lunch, and alcohol in late afternoon and evening  -Avoid prolonged use of light-emitting screens before bedtime  -Exercise regularly for at least 20 minutes, preferably more than four to five hours prior to bedtime  -Avoid daytime naps, especially if they are longer than 20 to 30 minutes or occur late in the day  -Advised to contact our office or myself with any  questions via MyChart    Regarding treatment of other past medical problems and general health maintenance,  Pt is urged to follow up with PCP.      Please note portions of this record was created using voice recognition software. I have made every reasonable attempt to correct obvious errors, but I expect that there are errors of grammar and possibly content I did not discover before finalizing the note.

## 2022-07-10 ENCOUNTER — SLEEP STUDY (OUTPATIENT)
Dept: SLEEP MEDICINE | Facility: MEDICAL CENTER | Age: 22
End: 2022-07-10
Attending: STUDENT IN AN ORGANIZED HEALTH CARE EDUCATION/TRAINING PROGRAM
Payer: COMMERCIAL

## 2022-07-10 DIAGNOSIS — F51.04 CHRONIC INSOMNIA: ICD-10-CM

## 2022-07-10 DIAGNOSIS — G47.00 FREQUENT NOCTURNAL AWAKENING: ICD-10-CM

## 2022-07-10 DIAGNOSIS — G47.30 SLEEP DISORDER BREATHING: ICD-10-CM

## 2022-07-10 DIAGNOSIS — G47.19 EXCESSIVE DAYTIME SLEEPINESS: ICD-10-CM

## 2022-07-10 DIAGNOSIS — R06.83 LOUD SNORING: ICD-10-CM

## 2022-07-10 PROCEDURE — 95811 POLYSOM 6/>YRS CPAP 4/> PARM: CPT | Performed by: STUDENT IN AN ORGANIZED HEALTH CARE EDUCATION/TRAINING PROGRAM

## 2022-07-12 NOTE — PROCEDURES
MONTAGE: Standard  STUDY TYPE: Split Night    RECORDING TECHNIQUE:   After the scalp was prepared, gold plated electrodes were applied to the scalp according to the International 10-20 System. EEG (electroencephalogram) was continuously monitored from the O1-M2, O2-M1, C3-M2, C4-M1, F3-M2, and F4-M1. EOGs (electrooculograms) were monitored by electrodes placed at the left and right outer canthi. Chin EMG (electromyogram) was monitored by electrodes placed on the mentalis and sub-mentalis muscles. Nasal and oral airflow were monitored using a triple port thermocouple as well as oronasal pressure transducer. Respiratory effort was measured by inductive plethysmography technology employing abdominal and thoracic belts. Blood oxygen saturation and pulse were monitored by pulse oximetry. Heart rhythm was monitored by surface electrocardiogram. Leg EMG was studied using surface electrodes placed on left and right anterior tibialis. A microphone was used to monitor tracheal sounds and snoring. Body position was monitored and documented by technician observation.   SCORING CRITERIA:   A modification of the AASM manual for scoring of sleep and associated events was used. Obstructive apneas were scored by cessation of airflow for at least 10 seconds with continuing respiratory effort. Central apneas were scored by cessation of airflow for at least 10 seconds with no respiratory effort. Hypopneas were scored by a 30% or more reduction in airflow for at least 10 seconds accompanied by arterial oxygen desaturation of 3% or an arousal. For CMS (Medicare) patients, per AASM rule 1B, hypopneas are scored by 30% with mild reduction in airflow for at least 10 seconds accompanied by arterial saturation decreased at 4%.    DIAGNOSTIC  Study start time was 09:30:47 PM. Diagnostic recording time was 177 minutes with a total sleep time of 138 minutes resulting in a sleep efficiency of 78.25%%. Sleep latency from the start of the study was  13 minutes and the latency from sleep to REM was 00 minutes. In total,86 arousals were scored for an arousal index of 37.3.  Respiratory:  There were a total of 78 apneas consisting of 74 obstructive apneas, 0 mixed apneas, and 4 central apneas. A total of 180 hypopneas were scored. The apnea index was 33.79 per hour and the hypopnea index was 77.98 per hour resulting in an overall AHI of 111.77. AHI during rem was 0.0 and AHI while supine was 131.45.  Oximetry:  There was a mean oxygen saturation of 89.0%. The minimum oxygen saturation during NREM sleep was73.0% and in REM was --. Time spent during sleep with oxygen saturations <88% was 55.7 minutes.   Cardiac:  The highest heart rate seen while awake was 108 BPM while the highest heart rate during sleep was 109 BPM with an average sleeping heart rate of 73 BPM.  Limb Movements:  There were a total of 71 PLMs during sleep, which resulted in a PLM index of 30.8. There were 9 PLMs associated with arousals which resulted in a PLMS arousal index of 3.9.    TREATMENT:  Treatment recording time was 5h 37.0m (337 minutes) with a total sleep time of 4h 43.0m (283 minutes) resulting in a sleep efficiency of 84.0%. Sleep latency from the start of treatment was 09 minutes and REM latency from sleep onset was 1h 6.5m. The patient had 108 arousals in total for an arousal index of 22.9.  Respiratory:   There were 6 apneas in total consisting of 5 obstructive apneas, 1 central apneas, and 0 mixed apneas for an apnea index of 1.27. The patient had 134 hypopneas in total, which resulted in a hypopnea index of 28.41. The overall AHI was 29.68, with a REM AHI of 33.49, and a supine AHI of 29.79.   Oximetry:  The mean SaO2 during treatment was 94.0%. The minimum oxygen saturation in NREM was77.0 % and in REM was 83.0%. Patient spent 12.3 minutes of TST with SaO2 <88%.  Cardiac:  The highest heart rate during sleep was 98 BPM with an average sleeping heart rate of 67BPM.  Limb  Movements:  There were a total of 36 PLMS during titration sleep time that resulted in an index of 7.6. There were 5 PLMS associated with arousals. This resulted in a PLM arousal index of 1.1.  Titration: CPAP was tried from 5 to 15cm H2O. BiPAP was tried from 17/13 to 19/15cm H2O.  This was a fully attended sleep study. This test was technically adequate. This patient was titrated on CPAP starting at 5 cm of water pressure. Patient was titrated up to BiPAP 19/15 cm of water pressure. Patient did best at BiPAP 18/14 cm of water pressure. Patient spent 74 minutes at that pressure and their AHI was 4.03 which is considered treated obstructive sleep apnea.     Impression:  1.  Severe obstructive sleep apnea with overall   2.  Significant nocturnal hypoxia secondary to untreated sleep apnea minimum oxygen saturation 73% during diagnostic portion and time at or below 80% saturation at 56 minutes  3.  Improvement with PAP therapy  4.  Supine REM sleep was seen during BiPAP therapy  5.  Appeared to do best with BiPAP therapy  6.  Oxygenation improved with higher levels of PAP pressure    Recommendations:  I recommend auto BiPAP of EPAP min 12 IPAP max 18 pressure support 4 cm.  Patient used a medium AirFit F 20 during study.      I also recommend 30 day compliance download to assess the efficacy to the recommended pressure, measure leak, apnea hypopnea index and compliance for further outpatient monitoring and management of PAP therapy. In some cases alternative treatment options may be proven effective in resolving sleep apnea. These options include upper airway surgery, the use of a dental orthotic, weight loss orpositional therapy. Clinical correlation is required. In general patients with sleep apnea are advised to avoid alcohol, sedatives and not to operate a motor vehicle while drowsy.  Untreated sleep apnea increases the risk for cardiovascular and neurovascular disease.

## 2022-08-09 ENCOUNTER — OFFICE VISIT (OUTPATIENT)
Dept: SLEEP MEDICINE | Facility: MEDICAL CENTER | Age: 22
End: 2022-08-09
Payer: COMMERCIAL

## 2022-08-09 VITALS
DIASTOLIC BLOOD PRESSURE: 68 MMHG | WEIGHT: 307 LBS | BODY MASS INDEX: 43.95 KG/M2 | HEIGHT: 70 IN | HEART RATE: 98 BPM | OXYGEN SATURATION: 95 % | SYSTOLIC BLOOD PRESSURE: 104 MMHG | RESPIRATION RATE: 14 BRPM

## 2022-08-09 DIAGNOSIS — G47.33 OSA (OBSTRUCTIVE SLEEP APNEA): Primary | ICD-10-CM

## 2022-08-09 DIAGNOSIS — R06.83 LOUD SNORING: ICD-10-CM

## 2022-08-09 DIAGNOSIS — G47.19 EXCESSIVE DAYTIME SLEEPINESS: ICD-10-CM

## 2022-08-09 PROCEDURE — 99213 OFFICE O/P EST LOW 20 MIN: CPT | Performed by: STUDENT IN AN ORGANIZED HEALTH CARE EDUCATION/TRAINING PROGRAM

## 2022-08-09 ASSESSMENT — FIBROSIS 4 INDEX: FIB4 SCORE: 0.15

## 2022-08-09 NOTE — PROGRESS NOTES
[facility] Follow-up Visit    CC: Follow-up to discuss sleep study results      HPI:  Amish Curry is a 22 y.o.male  with anxiety, depression, obesity, and severe obstructive sleep apnea.  Presents today to discuss sleep study results.    Reports night sleep study went well.  He was surprised that he was able to sleep given the instrumentation in unfamiliar environment.  He did not find the instrumentation be too interrupted to sleep.  He was placed on PAP therapy during the night of study and found that he was able to sleep deeper with using a Pap machine.    He continues to snore in his sleep and he continues to be tired during the day.      Patient Active Problem List    Diagnosis Date Noted   • Sleep concern 03/25/2022   • Daytime sleepiness 03/25/2022   • Moderate episode of recurrent major depressive disorder (HCC) 03/25/2022   • TMJ (temporomandibular joint syndrome) 03/25/2022   • Morbid obesity (HCC) 05/12/2021   • Irritable bowel syndrome with both constipation and diarrhea 05/12/2021   • Disturbed concentration 01/24/2019   • Prediabetes 12/13/2018   • Major depressive disorder 11/28/2018   • History of eye surgery 11/28/2018   • Family history of diabetes mellitus type II 03/03/2015       Past Medical History:   Diagnosis Date   • Abdominal pain    • Allergy    • Apnea, sleep    • Back pain    • Back pain    • Blood in urine    • Blurred vision    • Breath shortness     sometimes with gall bladder attack   • Chills    • Constipation    • Daytime sleepiness    • Depression    • Diarrhea    • Dizziness    • Earache    • Eye pain    • Fatigue    • Frequent headaches    • Frequent urination    • Gasping for breath    • Headache    • Hoarseness, persistent    • Insomnia    • Morning headache    • Nausea    • Obesity    • Other specified disorder of intestines     constipation/diarrhea   • Pain     neck, back, chest, right upper abd   • Painful urination    • Rhinitis    • Ringing in ears     • Snoring    • Sore muscles    • Sore throat, chronic    • Toothache         Past Surgical History:   Procedure Laterality Date   • COURTNEY BY LAPAROSCOPY N/A 8/6/2015    Procedure: COURTNEY BY LAPAROSCOPY;  Surgeon: Roya Adams M.D.;  Location: SURGERY [facility];  Service:    • EYE MUSCLE REPAIR     • EYE SURGERY         Family History   Problem Relation Age of Onset   • Hyperlipidemia Father    • Diabetes Maternal Grandmother    • Diabetes Paternal Grandmother    • Cancer Paternal Grandmother    • Stroke Paternal Grandfather    • Heart Attack Neg Hx    • Heart Disease Neg Hx        Social History     Socioeconomic History   • Marital status: Single     Spouse name: Not on file   • Number of children: Not on file   • Years of education: Not on file   • Highest education level: Not on file   Occupational History   • Not on file   Tobacco Use   • Smoking status: Never Smoker   • Smokeless tobacco: Never Used   • Tobacco comment: Nicki smoked before but only out of curiosity not often if at all   Vaping Use   • Vaping Use: Never used   Substance and Sexual Activity   • Alcohol use: Yes     Comment: When I go out sometimes but that can mean I go weeks to [place]   • Drug use: No   • Sexual activity: Not Currently   Other Topics Concern   • Not on file   Social History Narrative   • Not on file     Social Determinants of Health     Financial Resource Strain: Not on file   Food Insecurity: Not on file   Transportation Needs: Not on file   Physical Activity: Not on file   Stress: Not on file   Social Connections: Not on file   Intimate Partner Violence: Not on file   Housing Stability: Not on file       Current Outpatient Medications   Medication Sig Dispense Refill   • atomoxetine (STRATTERA) 18 MG capsule Take 18 mg by mouth every day.     • sertraline (ZOLOFT) 25 MG tablet Take 25 mg by mouth every day. for 30 days     • busPIRone (BUSPAR) 10 MG Tab tablet Take 10 mg by mouth 2 times a day.       No current  "facility-administered medications for this visit.        ALLERGIES: Patient has no known allergies.    ROS  Constitutional: Denies fevers, Denies weight changes  Ears/Nose/Throat/Mouth: Denies nasal congestion or sore throat   Cardiovascular: Denies chest pain  Respiratory: Denies shortness of breath, Denies cough  Gastrointestinal/Hepatic: Denies nausea, vomiting  Sleep: see HPI      PHYSICAL EXAM  /68 (BP Location: Left arm, Patient Position: Sitting, BP Cuff Size: Adult)   Pulse 98   Resp 14   Ht 1.778 m (5' 10\")   Wt (!) 139 kg (307 lb)   SpO2 95%   BMI 44.05 kg/m²   Appearance: Well-nourished, well-developed, no acute distress  Eyes:  No scleral icterus , EOMI  ENMT: masked  Musculoskeletal:  Grossly normal; gait and station normal; digits and nails normal  Skin:  No rashes, petechiae, cyanosis  Neurologic: without focal signs; oriented to person, time, place, and purpose; judgement intact      Medical Decision Making   Assessment and Plan  Amish Curry is a 22 y.o.male  with anxiety, depression, obesity, and severe obstructive sleep apnea.  Presents today to discuss sleep study results.    Obstructive sleep apnea   Reviewed recent PSG split-night with patient showing an AHI of 111.77, and Min Oxygen saturation of 73%.  Time at or below 88% saturation during diagnostic portion of 55.7 minutes  Based on sleep study and symptoms meets criteria for severe obstructive sleep apnea.   We discussed the pathophysiology of obstructive sleep apnea (ROXANE) and risk factors for the disease. We also discussed possible consequences of untreated ROXANE, including excessive daytime sleepiness and fatigue, cognitive dysfunction, cardiovascular complications such as elevated blood pressure, heart attacks, cardiac arrhythmias, and strokes. We discussed how ROXANE typically gets worse with age. We discussed treatment options for ROXANE, including the gold standard therapy (PAP), alternative options such as a mandibular " advancement device (custom-made oral appliances) and surgeries. We will proceed BiPAP therapy.     RECOMMENDATIONS  -Start Auto BiPAP at pressures EPAP 12 IPAP 18 pressure support 4  -Discussed importance of adherence/compliance   -Prescription generated for supplies   -Patient counseled to avoid driving when sleepy. Encouraged to anticipate sleepiness, consider taking a 10 min nap prior to driving, alternate with another , or pull over if sleepy while driving  -Advised to contact our office or myself with any questions via [employer]  -Follow up in 3 months or sooner if needed      Positive airway pressure will favorably impact many of the adverse conditions and effects provoked by ROXANE.    Have advised the patient to follow up with the appropriate healthcare practitioners for all other medical problems and issues.    Return for 1-2 months after starting PAP therapy.      Please note portions of this record was created using voice recognition software. I have made every reasonable attempt to correct obvious errors, but I expect that there are errors of grammar and possibly content I did not discover before finalizing the note.

## 2023-01-05 ENCOUNTER — OFFICE VISIT (OUTPATIENT)
Dept: MEDICAL GROUP | Facility: MEDICAL CENTER | Age: 23
End: 2023-01-05
Payer: COMMERCIAL

## 2023-01-05 ENCOUNTER — TELEPHONE (OUTPATIENT)
Dept: SCHEDULING | Facility: IMAGING CENTER | Age: 23
End: 2023-01-05

## 2023-01-05 VITALS
RESPIRATION RATE: 20 BRPM | DIASTOLIC BLOOD PRESSURE: 58 MMHG | HEIGHT: 70 IN | WEIGHT: 314.49 LBS | SYSTOLIC BLOOD PRESSURE: 108 MMHG | TEMPERATURE: 97.8 F | OXYGEN SATURATION: 94 % | BODY MASS INDEX: 45.02 KG/M2 | HEART RATE: 77 BPM

## 2023-01-05 DIAGNOSIS — E78.5 HYPERLIPIDEMIA, UNSPECIFIED HYPERLIPIDEMIA TYPE: ICD-10-CM

## 2023-01-05 DIAGNOSIS — R10.11 RIGHT UPPER QUADRANT ABDOMINAL PAIN: ICD-10-CM

## 2023-01-05 DIAGNOSIS — Z23 NEED FOR VACCINATION: ICD-10-CM

## 2023-01-05 DIAGNOSIS — Z11.59 NEED FOR HEPATITIS C SCREENING TEST: ICD-10-CM

## 2023-01-05 DIAGNOSIS — M25.552 LEFT HIP PAIN: ICD-10-CM

## 2023-01-05 PROBLEM — R10.84 GENERALIZED ABDOMINAL PAIN: Status: ACTIVE | Noted: 2023-01-05

## 2023-01-05 PROCEDURE — 99214 OFFICE O/P EST MOD 30 MIN: CPT | Performed by: STUDENT IN AN ORGANIZED HEALTH CARE EDUCATION/TRAINING PROGRAM

## 2023-01-05 SDOH — ECONOMIC STABILITY: TRANSPORTATION INSECURITY
IN THE PAST 12 MONTHS, HAS THE LACK OF TRANSPORTATION KEPT YOU FROM MEDICAL APPOINTMENTS OR FROM GETTING MEDICATIONS?: NO

## 2023-01-05 SDOH — ECONOMIC STABILITY: FOOD INSECURITY: WITHIN THE PAST 12 MONTHS, THE FOOD YOU BOUGHT JUST DIDN'T LAST AND YOU DIDN'T HAVE MONEY TO GET MORE.: NEVER TRUE

## 2023-01-05 SDOH — ECONOMIC STABILITY: HOUSING INSECURITY
IN THE LAST 12 MONTHS, WAS THERE A TIME WHEN YOU DID NOT HAVE A STEADY PLACE TO SLEEP OR SLEPT IN A SHELTER (INCLUDING NOW)?: NO

## 2023-01-05 SDOH — HEALTH STABILITY: PHYSICAL HEALTH: ON AVERAGE, HOW MANY MINUTES DO YOU ENGAGE IN EXERCISE AT THIS LEVEL?: 70 MIN

## 2023-01-05 SDOH — HEALTH STABILITY: PHYSICAL HEALTH: ON AVERAGE, HOW MANY DAYS PER WEEK DO YOU ENGAGE IN MODERATE TO STRENUOUS EXERCISE (LIKE A BRISK WALK)?: 4 DAYS

## 2023-01-05 SDOH — ECONOMIC STABILITY: TRANSPORTATION INSECURITY
IN THE PAST 12 MONTHS, HAS LACK OF TRANSPORTATION KEPT YOU FROM MEETINGS, WORK, OR FROM GETTING THINGS NEEDED FOR DAILY LIVING?: NO

## 2023-01-05 SDOH — ECONOMIC STABILITY: INCOME INSECURITY: HOW HARD IS IT FOR YOU TO PAY FOR THE VERY BASICS LIKE FOOD, HOUSING, MEDICAL CARE, AND HEATING?: SOMEWHAT HARD

## 2023-01-05 SDOH — HEALTH STABILITY: MENTAL HEALTH
STRESS IS WHEN SOMEONE FEELS TENSE, NERVOUS, ANXIOUS, OR CAN'T SLEEP AT NIGHT BECAUSE THEIR MIND IS TROUBLED. HOW STRESSED ARE YOU?: VERY MUCH

## 2023-01-05 SDOH — ECONOMIC STABILITY: INCOME INSECURITY: IN THE LAST 12 MONTHS, WAS THERE A TIME WHEN YOU WERE NOT ABLE TO PAY THE MORTGAGE OR RENT ON TIME?: NO

## 2023-01-05 SDOH — ECONOMIC STABILITY: FOOD INSECURITY: WITHIN THE PAST 12 MONTHS, YOU WORRIED THAT YOUR FOOD WOULD RUN OUT BEFORE YOU GOT MONEY TO BUY MORE.: NEVER TRUE

## 2023-01-05 SDOH — ECONOMIC STABILITY: HOUSING INSECURITY: IN THE LAST 12 MONTHS, HOW MANY PLACES HAVE YOU LIVED?: 1

## 2023-01-05 SDOH — ECONOMIC STABILITY: TRANSPORTATION INSECURITY
IN THE PAST 12 MONTHS, HAS LACK OF RELIABLE TRANSPORTATION KEPT YOU FROM MEDICAL APPOINTMENTS, MEETINGS, WORK OR FROM GETTING THINGS NEEDED FOR DAILY LIVING?: NO

## 2023-01-05 ASSESSMENT — PATIENT HEALTH QUESTIONNAIRE - PHQ9
SUM OF ALL RESPONSES TO PHQ9 QUESTIONS 1 AND 2: 4
5. POOR APPETITE OR OVEREATING: NEARLY EVERY DAY
9. THOUGHTS THAT YOU WOULD BE BETTER OFF DEAD, OR OF HURTING YOURSELF: NOT AT ALL
1. LITTLE INTEREST OR PLEASURE IN DOING THINGS: MORE THAN HALF THE DAYS
3. TROUBLE FALLING OR STAYING ASLEEP OR SLEEPING TOO MUCH: NEARLY EVERY DAY
8. MOVING OR SPEAKING SO SLOWLY THAT OTHER PEOPLE COULD HAVE NOTICED. OR THE OPPOSITE, BEING SO FIGETY OR RESTLESS THAT YOU HAVE BEEN MOVING AROUND A LOT MORE THAN USUAL: MORE THAN HALF THE DAYS
SUM OF ALL RESPONSES TO PHQ QUESTIONS 1-9: 18
4. FEELING TIRED OR HAVING LITTLE ENERGY: SEVERAL DAYS
2. FEELING DOWN, DEPRESSED, IRRITABLE, OR HOPELESS: MORE THAN HALF THE DAYS
6. FEELING BAD ABOUT YOURSELF - OR THAT YOU ARE A FAILURE OR HAVE LET YOURSELF OR YOUR FAMILY DOWN: MORE THAN HALF THE DAYS
7. TROUBLE CONCENTRATING ON THINGS, SUCH AS READING THE NEWSPAPER OR WATCHING TELEVISION: NEARLY EVERY DAY

## 2023-01-05 ASSESSMENT — ENCOUNTER SYMPTOMS
COUGH: 0
FEVER: 0
ABDOMINAL PAIN: 1
NAUSEA: 0
VOMITING: 0
CHILLS: 0
FOCAL WEAKNESS: 0
SHORTNESS OF BREATH: 0

## 2023-01-05 ASSESSMENT — SOCIAL DETERMINANTS OF HEALTH (SDOH)
HOW OFTEN DO YOU ATTENT MEETINGS OF THE CLUB OR ORGANIZATION YOU BELONG TO?: NEVER
IN A TYPICAL WEEK, HOW MANY TIMES DO YOU TALK ON THE PHONE WITH FAMILY, FRIENDS, OR NEIGHBORS?: MORE THAN THREE TIMES A WEEK
WITHIN THE PAST 12 MONTHS, YOU WORRIED THAT YOUR FOOD WOULD RUN OUT BEFORE YOU GOT THE MONEY TO BUY MORE: NEVER TRUE
HOW OFTEN DO YOU ATTEND CHURCH OR RELIGIOUS SERVICES?: NEVER
HOW OFTEN DO YOU GET TOGETHER WITH FRIENDS OR RELATIVES?: ONCE A WEEK
HOW OFTEN DO YOU ATTENT MEETINGS OF THE CLUB OR ORGANIZATION YOU BELONG TO?: NEVER
HOW OFTEN DO YOU GET TOGETHER WITH FRIENDS OR RELATIVES?: ONCE A WEEK
HOW OFTEN DO YOU HAVE SIX OR MORE DRINKS ON ONE OCCASION: LESS THAN MONTHLY
HOW OFTEN DO YOU HAVE A DRINK CONTAINING ALCOHOL: 2-4 TIMES A MONTH
IN A TYPICAL WEEK, HOW MANY TIMES DO YOU TALK ON THE PHONE WITH FAMILY, FRIENDS, OR NEIGHBORS?: MORE THAN THREE TIMES A WEEK
DO YOU BELONG TO ANY CLUBS OR ORGANIZATIONS SUCH AS CHURCH GROUPS UNIONS, FRATERNAL OR ATHLETIC GROUPS, OR SCHOOL GROUPS?: NO
ARE YOU MARRIED, WIDOWED, DIVORCED, SEPARATED, NEVER MARRIED, OR LIVING WITH A PARTNER?: NEVER MARRIED
HOW MANY DRINKS CONTAINING ALCOHOL DO YOU HAVE ON A TYPICAL DAY WHEN YOU ARE DRINKING: 3 OR 4
HOW OFTEN DO YOU ATTEND CHURCH OR RELIGIOUS SERVICES?: NEVER
ARE YOU MARRIED, WIDOWED, DIVORCED, SEPARATED, NEVER MARRIED, OR LIVING WITH A PARTNER?: NEVER MARRIED
DO YOU BELONG TO ANY CLUBS OR ORGANIZATIONS SUCH AS CHURCH GROUPS UNIONS, FRATERNAL OR ATHLETIC GROUPS, OR SCHOOL GROUPS?: NO
HOW HARD IS IT FOR YOU TO PAY FOR THE VERY BASICS LIKE FOOD, HOUSING, MEDICAL CARE, AND HEATING?: SOMEWHAT HARD

## 2023-01-05 ASSESSMENT — FIBROSIS 4 INDEX: FIB4 SCORE: 0.15

## 2023-01-05 ASSESSMENT — LIFESTYLE VARIABLES
HOW MANY STANDARD DRINKS CONTAINING ALCOHOL DO YOU HAVE ON A TYPICAL DAY: 3 OR 4
SKIP TO QUESTIONS 9-10: 0
AUDIT-C TOTAL SCORE: 4
HOW OFTEN DO YOU HAVE SIX OR MORE DRINKS ON ONE OCCASION: LESS THAN MONTHLY
HOW OFTEN DO YOU HAVE A DRINK CONTAINING ALCOHOL: 2-4 TIMES A MONTH

## 2023-01-05 NOTE — PROGRESS NOTES
"Subjective:     CC: establish care    HPI:   Amish presents today with    Problem   Hyperlipidemia     Latest Reference Range & Units 11/28/18 11:31 04/05/22 06:28   LDL <100 mg/dL 152 (H) 113 (H)   (H): Data is abnormally high     Right Upper Quadrant Abdominal Pain    Chronic, since middle school.  Sometimes epigastric, sometimes RUQ.  After greasy, spicy food, cheese.  No nausea, vomiting.  S/p cholecystectomy 8 years ago.   H/o IBS  Had diarrhea, intermittent constipation     Left Hip Pain    For 4-5 months.         Health Maintenance: Completed    ROS:  Review of Systems   Constitutional:  Negative for chills and fever.   Respiratory:  Negative for cough and shortness of breath.    Cardiovascular:  Negative for chest pain and leg swelling.   Gastrointestinal:  Positive for abdominal pain. Negative for nausea and vomiting.   Musculoskeletal:  Positive for joint pain (left hip).   Neurological:  Negative for focal weakness.     Objective:     Exam:  /58 (BP Location: Left arm, Patient Position: Sitting, BP Cuff Size: Large adult)   Pulse 77   Temp 36.6 °C (97.8 °F) (Temporal)   Resp 20   Ht 1.778 m (5' 10\")   Wt (!) 143 kg (314 lb 7.8 oz)   SpO2 94%   BMI 45.12 kg/m²  Body mass index is 45.12 kg/m².    Physical Exam  Vitals reviewed.   HENT:      Head: Normocephalic.      Mouth/Throat:      Mouth: Mucous membranes are moist.      Pharynx: Oropharynx is clear.   Eyes:      Pupils: Pupils are equal, round, and reactive to light.   Cardiovascular:      Rate and Rhythm: Normal rate and regular rhythm.   Pulmonary:      Effort: Pulmonary effort is normal. No respiratory distress.      Breath sounds: No wheezing or rales.   Abdominal:      General: Abdomen is flat. Bowel sounds are normal. There is no distension.      Tenderness: There is no abdominal tenderness. There is no guarding.   Skin:     General: Skin is warm.   Neurological:      General: No focal deficit present.      Mental Status: He is alert " and oriented to person, place, and time.     Labs: reviewed    Assessment & Plan:     22 y.o. male with the following -       1. Need for hepatitis C screening test  - HCV Scrn ( [id number] 1xLife); Future    2. Hyperlipidemia, unspecified hyperlipidemia type  Chronic, stable. Recommended lifestyle modifications including diet and exercises    3. Right upper quadrant abdominal pain  Chronic. S/p cholecystectomy 8 years ago  - US-RUQ; Future    4. Left hip pain  Chronic.  - DX-HIP-COMPLETE - UNILATERAL 2+ LEFT; Future    I spent a total of 32 minutes with record review, exam, communication with the patient, communication with other providers, and documentation of this encounter.      Return in about 1 month (around 2023) for F/u labs, x ray, US.    Please note that this dictation was created using voice recognition software. I have made every reasonable attempt to correct obvious errors, but I expect that there are errors of grammar and possibly content that I did not discover before finalizing the note.

## 2023-02-07 ENCOUNTER — HOSPITAL ENCOUNTER (OUTPATIENT)
Facility: MEDICAL CENTER | Age: 23
End: 2023-02-07
Payer: COMMERCIAL

## 2023-02-07 ENCOUNTER — OFFICE VISIT (OUTPATIENT)
Dept: URGENT CARE | Facility: CLINIC | Age: 23
End: 2023-02-07
Payer: COMMERCIAL

## 2023-02-07 VITALS
OXYGEN SATURATION: 96 % | RESPIRATION RATE: 12 BRPM | SYSTOLIC BLOOD PRESSURE: 118 MMHG | HEIGHT: 70 IN | WEIGHT: 307.8 LBS | DIASTOLIC BLOOD PRESSURE: 60 MMHG | BODY MASS INDEX: 44.07 KG/M2 | HEART RATE: 93 BPM | TEMPERATURE: 98.4 F

## 2023-02-07 DIAGNOSIS — R05.1 ACUTE COUGH: ICD-10-CM

## 2023-02-07 DIAGNOSIS — R05.8 POST-VIRAL COUGH SYNDROME: ICD-10-CM

## 2023-02-07 LAB
FLUAV RNA SPEC QL NAA+PROBE: NEGATIVE
FLUBV RNA SPEC QL NAA+PROBE: NEGATIVE
RSV RNA SPEC QL NAA+PROBE: NEGATIVE
SARS-COV-2 RNA RESP QL NAA+PROBE: NOTDETECTED
SPECIMEN SOURCE: NORMAL

## 2023-02-07 PROCEDURE — 0241U HCHG SARS-COV-2 COVID-19 NFCT DS RESP RNA 4 TRGT MIC: CPT

## 2023-02-07 PROCEDURE — 99213 OFFICE O/P EST LOW 20 MIN: CPT

## 2023-02-07 RX ORDER — CETIRIZINE HYDROCHLORIDE 10 MG/1
10 TABLET ORAL DAILY
Qty: 30 TABLET | Refills: 0 | Status: SHIPPED | OUTPATIENT
Start: 2023-02-07 | End: 2023-12-27

## 2023-02-07 RX ORDER — ALBUTEROL SULFATE 90 UG/1
2 AEROSOL, METERED RESPIRATORY (INHALATION) EVERY 6 HOURS PRN
Qty: 8.5 G | Refills: 0 | Status: SHIPPED | OUTPATIENT
Start: 2023-02-07 | End: 2023-12-27

## 2023-02-07 RX ORDER — DOXYCYCLINE HYCLATE 100 MG
100 TABLET ORAL 2 TIMES DAILY
Qty: 14 TABLET | Refills: 0 | Status: SHIPPED | OUTPATIENT
Start: 2023-02-07 | End: 2023-02-14

## 2023-02-07 RX ORDER — BENZONATATE 100 MG/1
100 CAPSULE ORAL 3 TIMES DAILY PRN
Qty: 60 CAPSULE | Refills: 0 | Status: SHIPPED | OUTPATIENT
Start: 2023-02-07 | End: 2023-12-27

## 2023-02-07 ASSESSMENT — FIBROSIS 4 INDEX: FIB4 SCORE: 0.15

## 2023-02-07 NOTE — LETTER
February 7, 2023        To Whom It May Concern:    This is confirmation that Amish Curry was seen in urgent care today for illness. His covid test is pending. Please excuse him from work until Thursday 02/09/23. Please defer to company policies if his covid test is positive. If you have any questions please do not hesitate to call me at the phone number listed below.    Sincerely,      Quiana Maya, CHARLIE, FNP-C  [phone]

## 2023-02-07 NOTE — PROGRESS NOTES
Subjective:   Amish Curry is a 22 y.o. male who presents for Cough (X around 10 days, was in contact with someone who is COVID + (roommate))      HPI:    Patient presents to urgent care with concerns of known COVID exposure through his roommate.  He has been experiencing fever, chills, body aches, sore throat, nasal congestion, nonproductive cough.  Patient states he was improving, however experienced recurrent and worsening cough about 2 days ago.  Patient reports his coughing is intermittent.  He reports severe dry coughing fits sporadically.  Mild improvement with DayQuil, NyQuil.  Denies sore throat, ear pain  Denies fever, chills, night sweats, nausea, vomiting, diarrhea  Denies chest pain, shortness of breath, dizziness, palpitations, leg swelling    ROS As above in HPI    Medications:    No current outpatient medications on file prior to visit.     No current facility-administered medications on file prior to visit.        Allergies:   Patient has no known allergies.    Problem List:   Patient Active Problem List   Diagnosis    Family history of diabetes mellitus type II    Major depressive disorder    History of eye surgery    Prediabetes    Disturbed concentration    Morbid obesity (HCC)    Irritable bowel syndrome with both constipation and diarrhea    Sleep concern    Daytime sleepiness    Moderate episode of recurrent major depressive disorder (HCC)    TMJ (temporomandibular joint syndrome)    Hyperlipidemia    Right upper quadrant abdominal pain    Left hip pain        Surgical History:  Past Surgical History:   Procedure Laterality Date    COURTNEY BY LAPAROSCOPY N/A 8/6/2015    Procedure: COURTNEY BY LAPAROSCOPY;  Surgeon: Roya Adams M.D.;  Location: SURGERY [facility];  Service:     EYE MUSCLE REPAIR      EYE SURGERY         Past Social Hx:   Social History     Tobacco Use    Smoking status: Never    Smokeless tobacco: Never    Tobacco comments:     Nicki smoked before but only out of  "curiosity not often if at all   Vaping Use    Vaping Use: Never used   Substance Use Topics    Alcohol use: Yes     Comment: socially    Drug use: No          Problem list, medications, and allergies reviewed by myself today in Epic.     Objective:     /60 (BP Location: Left arm, Patient Position: Sitting, BP Cuff Size: Large adult long)   Pulse 93   Temp 36.9 °C (98.4 °F) (Temporal)   Resp 12   Ht 1.771 m (5' 9.72\")   Wt (!) 140 kg (307 lb 12.8 oz)   SpO2 96%   BMI 44.52 kg/m²     Physical Exam  Vitals and nursing note reviewed.   Constitutional:       General: He is not in acute distress.     Appearance: Normal appearance. He is not ill-appearing or diaphoretic.   HENT:      Head: Normocephalic and atraumatic.      Right Ear: Ear canal normal. Tympanic membrane is injected.      Left Ear: Ear canal normal. Tympanic membrane is injected.      Nose: No congestion or rhinorrhea.      Right Sinus: No maxillary sinus tenderness or frontal sinus tenderness.      Left Sinus: No maxillary sinus tenderness or frontal sinus tenderness.      Mouth/Throat:      Mouth: Mucous membranes are moist.      Pharynx: Oropharynx is clear. Uvula midline. Posterior oropharyngeal erythema present. No pharyngeal swelling or oropharyngeal exudate.      Tonsils: No tonsillar exudate or tonsillar abscesses. 1+ on the right. 1+ on the left.   Eyes:      Conjunctiva/sclera: Conjunctivae normal.      Pupils: Pupils are equal, round, and reactive to light.   Cardiovascular:      Rate and Rhythm: Normal rate and regular rhythm.      Heart sounds: Normal heart sounds.   Pulmonary:      Effort: No respiratory distress.      Breath sounds: Normal air entry. No stridor. Examination of the right-middle field reveals decreased breath sounds. Examination of the right-lower field reveals decreased breath sounds. Decreased breath sounds present. No wheezing, rhonchi or rales.   Chest:      Chest wall: No tenderness.   Abdominal:      General: " Bowel sounds are normal.      Palpations: Abdomen is soft.   Skin:     General: Skin is warm and dry.      Capillary Refill: Capillary refill takes less than 2 seconds.      Findings: No rash.   Neurological:      Mental Status: He is oriented to person, place, and time.       Assessment/Plan:     Diagnosis and associated orders:   1. Post-viral cough syndrome  - albuterol 108 (90 Base) MCG/ACT Aero Soln inhalation aerosol; Inhale 2 Puffs every 6 hours as needed for Shortness of Breath.  Dispense: 8.5 g; Refill: 0  - benzonatate (TESSALON) 100 MG Cap; Take 1 Capsule by mouth 3 times a day as needed for Cough.  Dispense: 60 Capsule; Refill: 0  - doxycycline (VIBRAMYCIN) 100 MG Tab; Take 1 Tablet by mouth 2 times a day for 7 days.  Dispense: 14 Tablet; Refill: 0  - cetirizine (ZYRTEC) 10 MG Tab; Take 1 Tablet by mouth every day.  Dispense: 30 Tablet; Refill: 0    2. Acute cough  - CoV-2 and Flu A/B by PCR (Cepheid); Future        Comments/MDM:     Cepheid PCR obtained due to prolonged course of illness  Supportive measures encouraged: Rest, increased oral hydration, NSAIDs/tylenol as needed per package instructions, decongestant, warm humidification, otc cough suppressant as needed.  Work note provided, will modify plan of care per PCR results  Follow up with PCP       Pt is clinically stable at today's acute urgent care visit.  No acute distress noted. Appropriate for outpatient management at this time.       Discussed DDx, management options (risks,benefits, and alternatives to planned treatment), natural progression and supportive care.  Expressed understanding and the treatment plan was agreed upon. Questions were encouraged and answered   Return to urgent care prn if new or worsening sx or if there is no improvement in condition prn.    Educated in Red flags and indications to immediately call 911 or present to the Emergency Department.   Advised the patient to follow-up with the primary care physician for  recheck, reevaluation, and consideration of further management.      Please note that this dictation was created using voice recognition software. I have made a reasonable attempt to correct obvious errors, but I expect that there are errors of grammar and possibly content that I did not discover before finalizing the note.    This note was electronically signed by Quiana Maya DNP

## 2023-08-04 ENCOUNTER — EMPLOYEE HEALTH (OUTPATIENT)
Dept: OCCUPATIONAL MEDICINE | Facility: CLINIC | Age: 23
End: 2023-08-04

## 2023-08-04 ENCOUNTER — HOSPITAL ENCOUNTER (OUTPATIENT)
Facility: MEDICAL CENTER | Age: 23
End: 2023-08-04
Attending: FAMILY MEDICINE
Payer: COMMERCIAL

## 2023-08-04 ENCOUNTER — EH NON-PROVIDER (OUTPATIENT)
Dept: OCCUPATIONAL MEDICINE | Facility: CLINIC | Age: 23
End: 2023-08-04

## 2023-08-04 VITALS
WEIGHT: 299 LBS | RESPIRATION RATE: 16 BRPM | OXYGEN SATURATION: 97 % | DIASTOLIC BLOOD PRESSURE: 86 MMHG | HEIGHT: 70 IN | TEMPERATURE: 98.2 F | HEART RATE: 81 BPM | BODY MASS INDEX: 42.8 KG/M2 | SYSTOLIC BLOOD PRESSURE: 128 MMHG

## 2023-08-04 DIAGNOSIS — Z02.89 ENCOUNTER FOR OCCUPATIONAL HEALTH ASSESSMENT: Primary | ICD-10-CM

## 2023-08-04 DIAGNOSIS — Z02.89 ENCOUNTER FOR OCCUPATIONAL HEALTH ASSESSMENT: ICD-10-CM

## 2023-08-04 LAB
AMP AMPHETAMINE: NORMAL
BAR BARBITURATES: NORMAL
BZO BENZODIAZEPINES: NORMAL
COC COCAINE: NORMAL
INT CON NEG: NORMAL
INT CON POS: NORMAL
MDMA ECSTASY: NORMAL
MET METHAMPHETAMINES: NORMAL
MTD METHADONE: NORMAL
OPI OPIATES: NORMAL
OXY OXYCODONE: NORMAL
PCP PHENCYCLIDINE: NORMAL
POC URINE DRUG SCREEN OCDRS: NORMAL
THC: NORMAL

## 2023-08-04 PROCEDURE — 94375 RESPIRATORY FLOW VOLUME LOOP: CPT | Performed by: FAMILY MEDICINE

## 2023-08-04 PROCEDURE — 8915 PR COMPREHENSIVE PHYSICAL: Performed by: FAMILY MEDICINE

## 2023-08-04 PROCEDURE — 86480 TB TEST CELL IMMUN MEASURE: CPT | Performed by: FAMILY MEDICINE

## 2023-08-04 PROCEDURE — 90715 TDAP VACCINE 7 YRS/> IM: CPT | Performed by: NURSE PRACTITIONER

## 2023-08-04 PROCEDURE — 80305 DRUG TEST PRSMV DIR OPT OBS: CPT | Performed by: FAMILY MEDICINE

## 2023-08-07 LAB
GAMMA INTERFERON BACKGROUND BLD IA-ACNC: 0.04 IU/ML
M TB IFN-G BLD-IMP: NEGATIVE
M TB IFN-G CD4+ BCKGRND COR BLD-ACNC: -0.02 IU/ML
MITOGEN IGNF BCKGRD COR BLD-ACNC: >10 IU/ML
QFT TB2 - NIL TBQ2: -0.02 IU/ML

## 2023-09-26 ENCOUNTER — IMMUNIZATION (OUTPATIENT)
Dept: OCCUPATIONAL MEDICINE | Facility: CLINIC | Age: 23
End: 2023-09-26

## 2023-09-26 DIAGNOSIS — Z23 NEED FOR VACCINATION: Primary | ICD-10-CM

## 2023-09-26 PROCEDURE — 90686 IIV4 VACC NO PRSV 0.5 ML IM: CPT | Performed by: PREVENTIVE MEDICINE

## 2023-11-09 ENCOUNTER — DOCUMENTATION (OUTPATIENT)
Dept: HEALTH INFORMATION MANAGEMENT | Facility: OTHER | Age: 23
End: 2023-11-09
Payer: COMMERCIAL

## 2023-11-10 ENCOUNTER — TELEPHONE (OUTPATIENT)
Dept: HEALTH INFORMATION MANAGEMENT | Facility: OTHER | Age: 23
End: 2023-11-10
Payer: COMMERCIAL

## 2023-12-27 ENCOUNTER — OFFICE VISIT (OUTPATIENT)
Dept: MEDICAL GROUP | Facility: MEDICAL CENTER | Age: 23
End: 2023-12-27
Payer: COMMERCIAL

## 2023-12-27 ENCOUNTER — HOSPITAL ENCOUNTER (OUTPATIENT)
Dept: LAB | Facility: MEDICAL CENTER | Age: 23
End: 2023-12-27
Attending: STUDENT IN AN ORGANIZED HEALTH CARE EDUCATION/TRAINING PROGRAM
Payer: COMMERCIAL

## 2023-12-27 VITALS
OXYGEN SATURATION: 93 % | HEIGHT: 70 IN | HEART RATE: 87 BPM | TEMPERATURE: 98.6 F | DIASTOLIC BLOOD PRESSURE: 70 MMHG | SYSTOLIC BLOOD PRESSURE: 108 MMHG | WEIGHT: 307.1 LBS | BODY MASS INDEX: 43.97 KG/M2 | RESPIRATION RATE: 16 BRPM

## 2023-12-27 DIAGNOSIS — Z11.4 SCREENING FOR HIV (HUMAN IMMUNODEFICIENCY VIRUS): ICD-10-CM

## 2023-12-27 DIAGNOSIS — Z86.39 HISTORY OF VITAMIN D DEFICIENCY: ICD-10-CM

## 2023-12-27 DIAGNOSIS — R73.03 PREDIABETES: ICD-10-CM

## 2023-12-27 DIAGNOSIS — E66.01 MORBID OBESITY (HCC): ICD-10-CM

## 2023-12-27 DIAGNOSIS — G47.33 OSA (OBSTRUCTIVE SLEEP APNEA): ICD-10-CM

## 2023-12-27 DIAGNOSIS — F50.81 BINGE EATING DISORDER: ICD-10-CM

## 2023-12-27 DIAGNOSIS — F41.1 GAD (GENERALIZED ANXIETY DISORDER): ICD-10-CM

## 2023-12-27 DIAGNOSIS — E55.9 VITAMIN D DEFICIENCY: ICD-10-CM

## 2023-12-27 DIAGNOSIS — F33.1 MODERATE EPISODE OF RECURRENT MAJOR DEPRESSIVE DISORDER (HCC): ICD-10-CM

## 2023-12-27 PROBLEM — R10.11 RIGHT UPPER QUADRANT ABDOMINAL PAIN: Status: RESOLVED | Noted: 2023-01-05 | Resolved: 2023-12-27

## 2023-12-27 PROBLEM — R40.0 DAYTIME SLEEPINESS: Status: RESOLVED | Noted: 2022-03-25 | Resolved: 2023-12-27

## 2023-12-27 LAB
25(OH)D3 SERPL-MCNC: 9 NG/ML (ref 30–100)
ALBUMIN SERPL BCP-MCNC: 4.3 G/DL (ref 3.2–4.9)
ALBUMIN/GLOB SERPL: 1.3 G/DL
ALP SERPL-CCNC: 101 U/L (ref 30–99)
ALT SERPL-CCNC: 35 U/L (ref 2–50)
ANION GAP SERPL CALC-SCNC: 11 MMOL/L (ref 7–16)
AST SERPL-CCNC: 20 U/L (ref 12–45)
BILIRUB SERPL-MCNC: 0.3 MG/DL (ref 0.1–1.5)
BUN SERPL-MCNC: 12 MG/DL (ref 8–22)
CALCIUM ALBUM COR SERPL-MCNC: 9.1 MG/DL (ref 8.5–10.5)
CALCIUM SERPL-MCNC: 9.3 MG/DL (ref 8.5–10.5)
CHLORIDE SERPL-SCNC: 101 MMOL/L (ref 96–112)
CHOLEST SERPL-MCNC: 179 MG/DL (ref 100–199)
CO2 SERPL-SCNC: 25 MMOL/L (ref 20–33)
CREAT SERPL-MCNC: 0.71 MG/DL (ref 0.5–1.4)
EST. AVERAGE GLUCOSE BLD GHB EST-MCNC: 111 MG/DL
GFR SERPLBLD CREATININE-BSD FMLA CKD-EPI: 131 ML/MIN/1.73 M 2
GLOBULIN SER CALC-MCNC: 3.2 G/DL (ref 1.9–3.5)
GLUCOSE SERPL-MCNC: 93 MG/DL (ref 65–99)
HBA1C MFR BLD: 5.5 % (ref 4–5.6)
HDLC SERPL-MCNC: 52 MG/DL
HIV 1+2 AB+HIV1 P24 AG SERPL QL IA: NORMAL
LDLC SERPL CALC-MCNC: 116 MG/DL
POTASSIUM SERPL-SCNC: 4.1 MMOL/L (ref 3.6–5.5)
PROT SERPL-MCNC: 7.5 G/DL (ref 6–8.2)
SODIUM SERPL-SCNC: 137 MMOL/L (ref 135–145)
TRIGL SERPL-MCNC: 57 MG/DL (ref 0–149)
TSH SERPL DL<=0.005 MIU/L-ACNC: 2.55 UIU/ML (ref 0.38–5.33)

## 2023-12-27 PROCEDURE — 80061 LIPID PANEL: CPT

## 2023-12-27 PROCEDURE — 3074F SYST BP LT 130 MM HG: CPT | Performed by: STUDENT IN AN ORGANIZED HEALTH CARE EDUCATION/TRAINING PROGRAM

## 2023-12-27 PROCEDURE — 82306 VITAMIN D 25 HYDROXY: CPT

## 2023-12-27 PROCEDURE — 83036 HEMOGLOBIN GLYCOSYLATED A1C: CPT

## 2023-12-27 PROCEDURE — 84443 ASSAY THYROID STIM HORMONE: CPT

## 2023-12-27 PROCEDURE — 80053 COMPREHEN METABOLIC PANEL: CPT

## 2023-12-27 PROCEDURE — 99215 OFFICE O/P EST HI 40 MIN: CPT | Performed by: STUDENT IN AN ORGANIZED HEALTH CARE EDUCATION/TRAINING PROGRAM

## 2023-12-27 PROCEDURE — 87389 HIV-1 AG W/HIV-1&-2 AB AG IA: CPT

## 2023-12-27 PROCEDURE — 3078F DIAST BP <80 MM HG: CPT | Performed by: STUDENT IN AN ORGANIZED HEALTH CARE EDUCATION/TRAINING PROGRAM

## 2023-12-27 PROCEDURE — 36415 COLL VENOUS BLD VENIPUNCTURE: CPT

## 2023-12-27 RX ORDER — BUSPIRONE HYDROCHLORIDE 10 MG/1
1 TABLET ORAL 3 TIMES DAILY
COMMUNITY
End: 2023-12-27

## 2023-12-27 RX ORDER — SERTRALINE HYDROCHLORIDE 25 MG/1
1 TABLET, FILM COATED ORAL DAILY
COMMUNITY
End: 2023-12-27

## 2023-12-27 RX ORDER — ESCITALOPRAM OXALATE 10 MG/1
10 TABLET ORAL DAILY
Qty: 30 TABLET | Refills: 0 | Status: SHIPPED | OUTPATIENT
Start: 2023-12-27 | End: 2024-01-22

## 2023-12-27 ASSESSMENT — ENCOUNTER SYMPTOMS
HEADACHES: 0
DEPRESSION: 0
SHORTNESS OF BREATH: 0
FALLS: 0
PALPITATIONS: 0
DIZZINESS: 0
BLURRED VISION: 0
FEVER: 0
SORE THROAT: 0
COUGH: 0
MYALGIAS: 0
BLOOD IN STOOL: 0
NAUSEA: 0
HEARTBURN: 0
WHEEZING: 0

## 2023-12-27 ASSESSMENT — ANXIETY QUESTIONNAIRES
GAD7 TOTAL SCORE: 15
7. FEELING AFRAID AS IF SOMETHING AWFUL MIGHT HAPPEN: SEVERAL DAYS
6. BECOMING EASILY ANNOYED OR IRRITABLE: MORE THAN HALF THE DAYS
6. BECOMING EASILY ANNOYED OR IRRITABLE: MORE THAN HALF THE DAYS
3. WORRYING TOO MUCH ABOUT DIFFERENT THINGS: NEARLY EVERY DAY
2. NOT BEING ABLE TO STOP OR CONTROL WORRYING: MORE THAN HALF THE DAYS
5. BEING SO RESTLESS THAT IT IS HARD TO SIT STILL: SEVERAL DAYS
4. TROUBLE RELAXING: NEARLY EVERY DAY
IF YOU CHECKED OFF ANY PROBLEMS ON THIS QUESTIONNAIRE, HOW DIFFICULT HAVE THESE PROBLEMS MADE IT FOR YOU TO DO YOUR WORK, TAKE CARE OF THINGS AT HOME, OR GET ALONG WITH OTHER PEOPLE: SOMEWHAT DIFFICULT
1. FEELING NERVOUS, ANXIOUS, OR ON EDGE: NEARLY EVERY DAY
2. NOT BEING ABLE TO STOP OR CONTROL WORRYING: MORE THAN HALF THE DAYS
IF YOU CHECKED OFF ANY PROBLEMS ON THIS QUESTIONNAIRE, HOW DIFFICULT HAVE THESE PROBLEMS MADE IT FOR YOU TO DO YOUR WORK, TAKE CARE OF THINGS AT HOME, OR GET ALONG WITH OTHER PEOPLE: SOMEWHAT DIFFICULT
7. FEELING AFRAID AS IF SOMETHING AWFUL MIGHT HAPPEN: SEVERAL DAYS
4. TROUBLE RELAXING: NEARLY EVERY DAY
GAD7 TOTAL SCORE: 15
5. BEING SO RESTLESS THAT IT IS HARD TO SIT STILL: SEVERAL DAYS
3. WORRYING TOO MUCH ABOUT DIFFERENT THINGS: NEARLY EVERY DAY
1. FEELING NERVOUS, ANXIOUS, OR ON EDGE: NEARLY EVERY DAY

## 2023-12-27 ASSESSMENT — PATIENT HEALTH QUESTIONNAIRE - PHQ9
1. LITTLE INTEREST OR PLEASURE IN DOING THINGS: SEVERAL DAYS
6. FEELING BAD ABOUT YOURSELF - OR THAT YOU ARE A FAILURE OR HAVE LET YOURSELF OR YOUR FAMILY DOWN: SEVERAL DAYS
5. POOR APPETITE OR OVEREATING: NEARLY EVERY DAY
SUM OF ALL RESPONSES TO PHQ9 QUESTIONS 1 AND 2: 2
7. TROUBLE CONCENTRATING ON THINGS, SUCH AS READING THE NEWSPAPER OR WATCHING TELEVISION: NEARLY EVERY DAY
8. MOVING OR SPEAKING SO SLOWLY THAT OTHER PEOPLE COULD HAVE NOTICED. OR THE OPPOSITE, BEING SO FIGETY OR RESTLESS THAT YOU HAVE BEEN MOVING AROUND A LOT MORE THAN USUAL: NEARLY EVERY DAY
2. FEELING DOWN, DEPRESSED, IRRITABLE, OR HOPELESS: SEVERAL DAYS
SUM OF ALL RESPONSES TO PHQ QUESTIONS 1-9: 14
9. THOUGHTS THAT YOU WOULD BE BETTER OFF DEAD, OR OF HURTING YOURSELF: NOT AT ALL
3. TROUBLE FALLING OR STAYING ASLEEP OR SLEEPING TOO MUCH: SEVERAL DAYS
4. FEELING TIRED OR HAVING LITTLE ENERGY: SEVERAL DAYS

## 2023-12-27 NOTE — PROGRESS NOTES
Subjective:     CC: Establishing care    HPI:   Amish presents today with    Labs reviewed last labs done in April 2022  Slightly elevated LDL, other lipid numbers are normal.  Normal thyroid function test  Problem   Ofe (Generalized Anxiety Disorder)    Chronic, uncontrolled  OFE-7 Questionnaire    Feeling nervous, anxious, or on edge: Nearly every day  Not being able to sop or control worrying: More than half the days  Worrying too much about different things: Nearly every day  Trouble relaxing: Nearly every day  Being so restless that it's hard to sit still: Several days  Becoming easily annoyed or irritable: More than half the days  Feeling afraid as if something awful might happen: Several days  Total: 15       Yash (Obstructive Sleep Apnea)    Chronic, stable  Diagnosed in 2022 with sleep apnea.  Currently using CPAP, compliant with the usage  Patient is encouraged to make a follow-up appointment with pulmonology and sleep.     Prediabetes    Chronic, stable  Patient has one-time A1c of 5.7 in 2018.  Since then he had 2 A1c's checked which were all within normal range  We will get another A1c.  Has a positive family history of diabetes     Moderate Episode of Recurrent Major Depressive Disorder (Hcc)    Chronic, uncontrolled   Previously was on medications - tried Wellbutrin and then was stable on Zoloft but eventually decided to go off medication after talking to his specialist.   Patient states that he was doing ok for a while but recently getting anxious and sometimes low mood due to binge eating which is making him feel stressed and guilty.  He has history of binge eating  disorder and thinks that was never addressed directly and thinks that that might be the reason for recurrence.     Depression Screening    Little interest or pleasure in doing things?   Several days  Feeling down, depressed , or hopeless?  Several days  Trouble falling or staying asleep, or sleeping too much?   Several days  Feeling  "tired or having little energy?   Several days  Poor appetite or overeating?   Nearly every day  Feeling bad about yourself - or that you are a failure or have let yourself or your family down?  Several days  Trouble concentrating on things, such as reading the newspaper or watching television?  Nearly every day  Moving or speaking so slowly that other people could have noticed.  Or the opposite - being so fidgety or restless that you have been moving around a lot more than usual?   Nearly every day  Thoughts that you would be better off dead, or of hurting yourself?   Not at all  Patient Health Questionnaire Score:  (!) 14         Right Upper Quadrant Abdominal Pain (Resolved)    Chronic, since middle school.  Sometimes epigastric, sometimes RUQ.  After greasy, spicy food, cheese.  No nausea, vomiting.  S/p cholecystectomy 8 years ago.   H/o IBS  Had diarrhea, intermittent constipation     Daytime Sleepiness (Resolved)       Health Maintenance: Completed    ROS:  Review of Systems   Constitutional:  Negative for fever and malaise/fatigue.   HENT:  Negative for congestion and sore throat.    Eyes:  Negative for blurred vision.   Respiratory:  Negative for cough, shortness of breath and wheezing.    Cardiovascular:  Negative for chest pain, palpitations and leg swelling.   Gastrointestinal:  Negative for blood in stool, heartburn and nausea.   Genitourinary:  Negative for dysuria and urgency.   Musculoskeletal:  Negative for falls and myalgias.   Neurological:  Negative for dizziness and headaches.   Psychiatric/Behavioral:  Negative for depression and suicidal ideas.        Review of systems unremarkable except for concerns noted by patient or items listed.    Please see HPI for additional ROS.      Objective:     Exam:  /70 (BP Location: Left arm, Patient Position: Sitting)   Pulse 87   Temp 37 °C (98.6 °F) (Temporal)   Resp 16   Ht 1.771 m (5' 9.72\")   Wt (!) 139 kg (307 lb 1.6 oz)   SpO2 93%   BMI " 44.42 kg/m²  Body mass index is 44.42 kg/m².    Physical Exam  Constitutional:       General: He is not in acute distress.     Appearance: Normal appearance.   HENT:      Head: Normocephalic.   Cardiovascular:      Rate and Rhythm: Normal rate and regular rhythm.      Pulses: Normal pulses.      Heart sounds: Normal heart sounds.   Pulmonary:      Effort: Pulmonary effort is normal.      Breath sounds: Normal breath sounds.   Skin:     General: Skin is warm.   Neurological:      Mental Status: He is alert and oriented to person, place, and time.   Psychiatric:         Mood and Affect: Mood normal.         Behavior: Behavior normal.             Labs: reviewed     Assessment & Plan:     23 y.o. male with the following -       1. Current moderate episode of major depressive disorder,  recurrent (HCC)    2. OFE (generalized anxiety disorder)  3. Binge eating disorder  Chronic, Uncontrolled  Patient currently not following up with any psychologist or psychiatrist.  Not on any medications.  Denies any SI or HI.  Discussed risk benefits of SSRIs.  Patient has tried Zoloft in the past discussed  Zoloft again or trying a new medication like Lexapro.  Patient decided to try Lexapro .'  We will start with the 10 mg dose once a day.  Follow-up in 4 weeks  - escitalopram (LEXAPRO) 10 MG Tab; Take 1 Tablet by mouth every day.  Dispense: 30 Tablet; Refill: 0  - Referral to Psychology  - TSH WITH REFLEX TO FT4; Future      4. History of vitamin D deficiency  History of vitamin deficiencies.  Patient is currently not on any supplements.  We will recheck his labs  - VITAMIN D,25 HYDROXY (DEFICIENCY); Future    5. Morbid obesity (HCC)  Chronic, uncontrolled  Patient reports that he tried exercising and has lost almost 20 to 40 pounds in the past but due to his binge eating probable he usually gains it back and this discourages him further.  Counseling and education provided  Referral to psychology provided.  Started on SSRIs as well  for binge eating problem  Encourage patient to restart exercising.  Also gave options of trying a nutritional referral.  Patient fussed wants to get established with a psychologist and then consider other options.  He also has history of prediabetes and family history of diabetes we will check his A1c and lipids  - Comp Metabolic Panel (fasting); Future  - HEMOGLOBIN A1C; Future  - Lipid Profile; Future    6. ROXANE (obstructive sleep apnea)  Chronic, stable  Continue CPAP every night  Follow-up with sleep medicine    7. Prediabetes  Chronic, stable  Last A1c was normal 5.5  Recheck A1c  Healthy diet and regular exercise  - HEMOGLOBIN A1C; Future    8. Screening for HIV (human immunodeficiency virus)  Routine screening.  No high risk behavior  - HIV AG/AB COMBO ASSAY SCREENING; Future      I spent a total of 45 minutes with record review, exam, communication with the patient, communication with other providers, and documentation of this encounter.      Return in about 4 weeks (around 1/24/2024) for medications check, chronic problems.    Please note that this dictation was created using voice recognition software. I have made every reasonable attempt to correct obvious errors, but I expect that there are errors of grammar and possibly content that I did not discover before finalizing the note.

## 2023-12-29 RX ORDER — ERGOCALCIFEROL 1.25 MG/1
50000 CAPSULE ORAL
Qty: 12 CAPSULE | Refills: 1 | Status: SHIPPED | OUTPATIENT
Start: 2023-12-29

## 2024-01-20 DIAGNOSIS — F41.1 GAD (GENERALIZED ANXIETY DISORDER): ICD-10-CM

## 2024-01-20 DIAGNOSIS — F50.81 BINGE EATING DISORDER: ICD-10-CM

## 2024-01-20 DIAGNOSIS — F33.1 MODERATE EPISODE OF RECURRENT MAJOR DEPRESSIVE DISORDER (HCC): ICD-10-CM

## 2024-01-22 RX ORDER — ESCITALOPRAM OXALATE 10 MG/1
10 TABLET ORAL DAILY
Qty: 30 TABLET | Refills: 1 | Status: SHIPPED | OUTPATIENT
Start: 2024-01-22 | End: 2024-02-08 | Stop reason: SDUPTHER

## 2024-01-22 NOTE — TELEPHONE ENCOUNTER
Received request via: Pharmacy    Was the patient seen in the last year in this department? Yes    Does the patient have an active prescription (recently filled or refills available) for medication(s) requested? No    Pharmacy Name: [facility]    Does the patient have nursing home Plus and need 100 day supply (blood pressure, diabetes and cholesterol meds only)? Patient does not have SCP    Future Appointments         Provider Department Center    2/8/2024 4:40 PM (Arrive by 4:25 PM) Ivania Dixon M.D. [facility]

## 2024-02-08 ENCOUNTER — OFFICE VISIT (OUTPATIENT)
Dept: MEDICAL GROUP | Facility: MEDICAL CENTER | Age: 24
End: 2024-02-08
Payer: COMMERCIAL

## 2024-02-08 VITALS
TEMPERATURE: 98.6 F | BODY MASS INDEX: 42.99 KG/M2 | SYSTOLIC BLOOD PRESSURE: 100 MMHG | OXYGEN SATURATION: 94 % | HEART RATE: 87 BPM | WEIGHT: 300.27 LBS | DIASTOLIC BLOOD PRESSURE: 60 MMHG | HEIGHT: 70 IN

## 2024-02-08 DIAGNOSIS — E55.9 VITAMIN D DEFICIENCY: ICD-10-CM

## 2024-02-08 DIAGNOSIS — F41.1 GAD (GENERALIZED ANXIETY DISORDER): ICD-10-CM

## 2024-02-08 DIAGNOSIS — E66.01 MORBID OBESITY (HCC): ICD-10-CM

## 2024-02-08 DIAGNOSIS — F50.81 BINGE EATING DISORDER: ICD-10-CM

## 2024-02-08 DIAGNOSIS — E78.00 ELEVATED LDL CHOLESTEROL LEVEL: ICD-10-CM

## 2024-02-08 PROCEDURE — 3074F SYST BP LT 130 MM HG: CPT | Performed by: STUDENT IN AN ORGANIZED HEALTH CARE EDUCATION/TRAINING PROGRAM

## 2024-02-08 PROCEDURE — 3078F DIAST BP <80 MM HG: CPT | Performed by: STUDENT IN AN ORGANIZED HEALTH CARE EDUCATION/TRAINING PROGRAM

## 2024-02-08 PROCEDURE — 99214 OFFICE O/P EST MOD 30 MIN: CPT | Performed by: STUDENT IN AN ORGANIZED HEALTH CARE EDUCATION/TRAINING PROGRAM

## 2024-02-08 RX ORDER — ESCITALOPRAM OXALATE 10 MG/1
10 TABLET ORAL DAILY
Qty: 90 TABLET | Refills: 1 | Status: SHIPPED | OUTPATIENT
Start: 2024-02-08

## 2024-02-08 RX ORDER — ESCITALOPRAM OXALATE 5 MG/1
5 TABLET ORAL DAILY
Qty: 90 TABLET | Refills: 1 | Status: SHIPPED | OUTPATIENT
Start: 2024-02-08

## 2024-02-08 ASSESSMENT — PATIENT HEALTH QUESTIONNAIRE - PHQ9: CLINICAL INTERPRETATION OF PHQ2 SCORE: 0

## 2024-02-09 NOTE — PROGRESS NOTES
"Subjective:     CC:   Chief Complaint   Patient presents with    Anxiety     Follow up Lexapro, discuss increasing the dose     Lab Results         HPI:   Amish presents today to follow-up on lab results and    Labs reviewed   Vit D very low 9      ALK phop 101       Problem   Binge Eating Disorder   Elevated Ldl Cholesterol Level   Ofe (Generalized Anxiety Disorder)    Chronic, improved   OFE score : 6  Improved a lot after starting on lexapro 10 mg but still have few episodes when he gets panicked suddenly which lasts few seconds to few minutes  Patient wondering if he can take a little higher dose of lexapro.       Morbid Obesity (Hcc)    Chronic, improving   Lost 7 lbs in 6 weeks.   Has been trying to eat healthy  Recommended to watch calories and increase exercise.         Health Maintenance: Completed    ROS:  ROS    Review of systems unremarkable except for concerns noted by patient or items listed.    Please see HPI for additional ROS.      Objective:     Exam:  /60 (BP Location: Left arm, Patient Position: Sitting, BP Cuff Size: Adult)   Pulse 87   Temp 37 °C (98.6 °F) (Temporal)   Ht 1.771 m (5' 9.72\")   Wt (!) 136 kg (300 lb 4.3 oz)   SpO2 94%   BMI 43.43 kg/m²  Body mass index is 43.43 kg/m².    Physical Exam  Constitutional:       Appearance: Normal appearance.   HENT:      Head: Normocephalic and atraumatic.      Nose: Nose normal.   Eyes:      Conjunctiva/sclera: Conjunctivae normal.   Cardiovascular:      Rate and Rhythm: Normal rate.   Neurological:      Mental Status: He is alert and oriented to person, place, and time. Mental status is at baseline.   Psychiatric:         Mood and Affect: Mood normal.         Behavior: Behavior normal.             Labs: reviewed     Assessment & Plan:     23 y.o. male with the following -     1. OFE (generalized anxiety disorder)  Chronic, improving, partially controlled  Patient states improvement in his eating habits.  He is not binging as " much as he was doing in the past.  Feels better with Lexapro but he still have mild anxiety and episodes of panic.  Interested in increasing the dose of Lexapro  Plan  Will increase Lexapro to 15 mg once daily and of 10 mg.  Continue relaxation techniques and therapy with psychology  - escitalopram (LEXAPRO) 10 MG Tab; Take 1 Tablet by mouth every day.  Dispense: 90 Tablet; Refill: 1  - escitalopram (LEXAPRO) 5 MG tablet; Take 1 Tablet by mouth every day.  Dispense: 90 Tablet; Refill: 1    2. Morbid obesity (HCC)  Appropriate dosing and education provided with healthy low calorie diet and regular exercise for weight loss.  Patient has lost about 6 to 7 pounds since last visit with changes in his diet.  Appreciated  Continue exercise and healthy diet    3. Binge eating disorder  Chronic, improving  Increase Lexapro to 15 mg once daily  Continue therapy with psychology    4. Elevated LDL cholesterol level  New finding and labs.  Mildly elevated LDL  Recommended low saturated fat diet and regular exercise.      Decreasing your intake of saturated fats which are found in meats that come from a cow or pig. Saturated fats are also found in creams, cheeses, butter, mayonnaise, and fried foods   eat more vegetables, fruits, fish, and healthy oils like olive oil.   moderate intensity exercise      5. Vitamin D deficiency  Chronic problems  Labs show very low vitamin D  Take 50,000 units of ergocalciferol once every 7 days for 3 months.  Continue over-the-counter vitamin D 2000 IU/day      Return in about 6 months (around 8/8/2024), or if symptoms worsen or fail to improve, for Anxiety.    Please note that this dictation was created using voice recognition software. I have made every reasonable attempt to correct obvious errors, but I expect that there are errors of grammar and possibly content that I did not discover before finalizing the note.

## 2024-02-18 PROBLEM — F50.819 BINGE EATING DISORDER: Status: ACTIVE | Noted: 2024-02-18

## 2024-02-18 PROBLEM — E78.00 ELEVATED LDL CHOLESTEROL LEVEL: Status: ACTIVE | Noted: 2024-02-18

## 2024-02-18 PROBLEM — F50.81 BINGE EATING DISORDER: Status: ACTIVE | Noted: 2024-02-18

## 2024-05-09 ENCOUNTER — OFFICE VISIT (OUTPATIENT)
Dept: URGENT CARE | Facility: CLINIC | Age: 24
End: 2024-05-09
Payer: COMMERCIAL

## 2024-05-09 VITALS
TEMPERATURE: 97.4 F | RESPIRATION RATE: 16 BRPM | WEIGHT: 300 LBS | HEART RATE: 96 BPM | DIASTOLIC BLOOD PRESSURE: 82 MMHG | OXYGEN SATURATION: 98 % | BODY MASS INDEX: 44.43 KG/M2 | HEIGHT: 69 IN | SYSTOLIC BLOOD PRESSURE: 144 MMHG

## 2024-05-09 DIAGNOSIS — J06.9 VIRAL URI: ICD-10-CM

## 2024-05-09 LAB
FLUAV RNA SPEC QL NAA+PROBE: NEGATIVE
FLUBV RNA SPEC QL NAA+PROBE: NEGATIVE
RSV RNA SPEC QL NAA+PROBE: NEGATIVE
SARS-COV-2 RNA RESP QL NAA+PROBE: NEGATIVE

## 2024-05-09 PROCEDURE — 3077F SYST BP >= 140 MM HG: CPT | Performed by: NURSE PRACTITIONER

## 2024-05-09 PROCEDURE — 3079F DIAST BP 80-89 MM HG: CPT | Performed by: NURSE PRACTITIONER

## 2024-05-09 PROCEDURE — 0241U POCT CEPHEID COV-2, FLU A/B, RSV - PCR: CPT | Performed by: NURSE PRACTITIONER

## 2024-05-09 PROCEDURE — 99213 OFFICE O/P EST LOW 20 MIN: CPT | Performed by: NURSE PRACTITIONER

## 2024-05-09 ASSESSMENT — ENCOUNTER SYMPTOMS
SORE THROAT: 0
SINUS PAIN: 0
RHINORRHEA: 1
WHEEZING: 0
HEADACHES: 0
COUGH: 1
CHILLS: 1
FEVER: 0

## 2024-05-09 NOTE — PROGRESS NOTES
"Subjective:   Amish Curry is a 24 y.o. male who presents for Shortness of Breath      URI   This is a new problem. Episode onset: 3 days. The problem has been unchanged. Associated symptoms include congestion, coughing and rhinorrhea. Pertinent negatives include no ear pain, headaches, plugged ear sensation, sinus pain, sore throat or wheezing.       Review of Systems   Constitutional:  Positive for chills and malaise/fatigue. Negative for fever.   HENT:  Positive for congestion and rhinorrhea. Negative for ear pain, sinus pain and sore throat.    Respiratory:  Positive for cough. Negative for wheezing.    Neurological:  Negative for headaches.       Medications:    ergocalciferol  escitalopram  escitalopram Tabs    Allergies: Patient has no known allergies.    Problem List: Amish Curry does not have any pertinent problems on file.    Surgical History:  Past Surgical History:   Procedure Laterality Date    COURTNEY BY LAPAROSCOPY N/A 8/6/2015    Procedure: COURTNEY BY LAPAROSCOPY;  Surgeon: Roya Adams M.D.;  Location: SURGERY [facility];  Service:     EYE MUSCLE REPAIR      EYE SURGERY         Past Social Hx: Amish Curry  reports that he has never smoked. He has never used smokeless tobacco. He reports current alcohol use. He reports that he does not use drugs.     Past Family Hx:  Amish Curry family history includes Cancer in his paternal grandmother; Diabetes in his maternal grandmother and paternal grandmother; Hyperlipidemia in his father; Ovarian Cancer in his paternal grandmother; Peritoneal Cancer in his maternal uncle; Stroke in his paternal grandfather.     Problem list, medications, and allergies reviewed by myself today in Epic.     Objective:     BP (!) 144/82   Pulse 96   Temp 36.3 °C (97.4 °F) (Temporal)   Resp 16   Ht 1.753 m (5' 9\")   Wt (!) 136 kg (300 lb)   SpO2 98%   BMI 44.30 kg/m²     Physical Exam  Vitals and nursing note reviewed. "   Constitutional:       General: He is not in acute distress.     Appearance: He is well-developed.   HENT:      Head: Normocephalic and atraumatic.      Right Ear: Tympanic membrane and external ear normal.      Left Ear: Tympanic membrane and external ear normal.      Nose: Nose normal.      Right Sinus: No maxillary sinus tenderness or frontal sinus tenderness.      Left Sinus: No maxillary sinus tenderness or frontal sinus tenderness.      Mouth/Throat:      Mouth: Mucous membranes are moist.      Pharynx: Uvula midline. No posterior oropharyngeal erythema.      Tonsils: No tonsillar exudate or tonsillar abscesses.   Eyes:      General:         Right eye: No discharge.         Left eye: No discharge.      Conjunctiva/sclera: Conjunctivae normal.   Cardiovascular:      Rate and Rhythm: Normal rate.   Pulmonary:      Effort: Pulmonary effort is normal. No respiratory distress.      Breath sounds: Normal breath sounds.   Abdominal:      General: There is no distension.   Musculoskeletal:         General: Normal range of motion.   Skin:     General: Skin is warm and dry.   Neurological:      General: No focal deficit present.      Mental Status: He is alert and oriented to person, place, and time. Mental status is at baseline.      Gait: Gait (gait at baseline) normal.   Psychiatric:         Judgment: Judgment normal.         Assessment/Plan:     Diagnosis and associated orders:     1. Viral URI  POCT CoV-2, Flu A/B, RSV by PCR         Comments/MDM:     The patient's presenting symptoms and exam findings most likely are due to a viral etiology.   Symptomatic and supportive care:   Plenty of oral hydration and rest   Over the counter cough suppressant as directed.  Tylenol or ibuprofen for pain and fever as directed.   Warm salt water gargles for sore throat, soft foods, cool liquids.   Saline nasal spray and Flonase  Infection control measures at home. Stay away from people, Hand washing, covering sneeze/cough,  disinfect surfaces.   Remain home from work, school, and other populated environments.    Overall, the patient is well-appearing. They are not hypoxic, afebrile, and a normal pulmonary exam.             Please note that this dictation was created using voice recognition software. I have made a reasonable attempt to correct obvious errors, but I expect that there are errors of grammar and possibly content that I did not discover before finalizing the note.    This note was electronically signed by Thomas REMY.

## 2024-05-30 DIAGNOSIS — F41.1 GAD (GENERALIZED ANXIETY DISORDER): ICD-10-CM

## 2024-05-30 DIAGNOSIS — F50.81 BINGE EATING DISORDER: ICD-10-CM

## 2024-05-31 RX ORDER — ESCITALOPRAM OXALATE 5 MG/1
5 TABLET ORAL DAILY
Qty: 90 TABLET | Refills: 1 | Status: SHIPPED | OUTPATIENT
Start: 2024-05-31

## 2024-05-31 RX ORDER — ESCITALOPRAM OXALATE 10 MG/1
10 TABLET ORAL DAILY
Qty: 90 TABLET | Refills: 1 | Status: SHIPPED | OUTPATIENT
Start: 2024-05-31

## 2024-09-24 ENCOUNTER — IMMUNIZATION (OUTPATIENT)
Dept: OCCUPATIONAL MEDICINE | Facility: CLINIC | Age: 24
End: 2024-09-24

## 2024-09-24 DIAGNOSIS — Z23 NEED FOR VACCINATION: Primary | ICD-10-CM

## 2024-10-31 ENCOUNTER — EH NON-PROVIDER (OUTPATIENT)
Dept: OCCUPATIONAL MEDICINE | Facility: CLINIC | Age: 24
End: 2024-10-31

## 2024-10-31 DIAGNOSIS — Z02.89 ENCOUNTER FOR OCCUPATIONAL HEALTH ASSESSMENT: ICD-10-CM

## 2024-11-14 ENCOUNTER — OFFICE VISIT (OUTPATIENT)
Dept: MEDICAL GROUP | Facility: MEDICAL CENTER | Age: 24
End: 2024-11-14
Payer: COMMERCIAL

## 2024-11-14 VITALS
TEMPERATURE: 97.6 F | BODY MASS INDEX: 45.11 KG/M2 | OXYGEN SATURATION: 94 % | SYSTOLIC BLOOD PRESSURE: 120 MMHG | DIASTOLIC BLOOD PRESSURE: 62 MMHG | HEART RATE: 78 BPM | HEIGHT: 69 IN | WEIGHT: 304.6 LBS

## 2024-11-14 DIAGNOSIS — Z11.3 SCREENING EXAMINATION FOR SEXUALLY TRANSMITTED DISEASE: ICD-10-CM

## 2024-11-14 DIAGNOSIS — Z00.00 HEALTHCARE MAINTENANCE: ICD-10-CM

## 2024-11-14 DIAGNOSIS — E66.01 MORBID OBESITY (HCC): ICD-10-CM

## 2024-11-14 DIAGNOSIS — G47.33 OSA (OBSTRUCTIVE SLEEP APNEA): ICD-10-CM

## 2024-11-14 DIAGNOSIS — F41.1 GAD (GENERALIZED ANXIETY DISORDER): ICD-10-CM

## 2024-11-14 DIAGNOSIS — F50.819 BINGE EATING DISORDER: ICD-10-CM

## 2024-11-14 PROCEDURE — 90471 IMMUNIZATION ADMIN: CPT | Performed by: STUDENT IN AN ORGANIZED HEALTH CARE EDUCATION/TRAINING PROGRAM

## 2024-11-14 PROCEDURE — 90746 HEPB VACCINE 3 DOSE ADULT IM: CPT | Performed by: STUDENT IN AN ORGANIZED HEALTH CARE EDUCATION/TRAINING PROGRAM

## 2024-11-14 PROCEDURE — 3074F SYST BP LT 130 MM HG: CPT | Performed by: STUDENT IN AN ORGANIZED HEALTH CARE EDUCATION/TRAINING PROGRAM

## 2024-11-14 PROCEDURE — RXMED WILLOW AMBULATORY MEDICATION CHARGE: Performed by: STUDENT IN AN ORGANIZED HEALTH CARE EDUCATION/TRAINING PROGRAM

## 2024-11-14 PROCEDURE — 99214 OFFICE O/P EST MOD 30 MIN: CPT | Mod: 25 | Performed by: STUDENT IN AN ORGANIZED HEALTH CARE EDUCATION/TRAINING PROGRAM

## 2024-11-14 PROCEDURE — 3078F DIAST BP <80 MM HG: CPT | Performed by: STUDENT IN AN ORGANIZED HEALTH CARE EDUCATION/TRAINING PROGRAM

## 2024-11-14 RX ORDER — ESCITALOPRAM OXALATE 5 MG/1
5 TABLET ORAL DAILY
Qty: 90 TABLET | Refills: 1 | Status: SHIPPED | OUTPATIENT
Start: 2024-11-14

## 2024-11-14 RX ORDER — ESCITALOPRAM OXALATE 10 MG/1
10 TABLET ORAL DAILY
Qty: 90 TABLET | Refills: 1 | Status: SHIPPED | OUTPATIENT
Start: 2024-11-14

## 2024-11-14 RX ORDER — TIRZEPATIDE 2.5 MG/.5ML
2.5 INJECTION, SOLUTION SUBCUTANEOUS
Qty: 2 ML | Refills: 1 | Status: SHIPPED | OUTPATIENT
Start: 2024-11-14

## 2024-11-14 NOTE — PROGRESS NOTES
"Subjective:     CC:  Chief Complaint   Patient presents with    Medication Management    Weight Loss    Lab Results     Std testing     Diabetes     Wants to have testing done for diabets           HPI:   Amish presents today with    Verbal consent was acquired by the patient to use [employer] ambient listening note generation during this visit Yes   History of Present Illness  The patient presents for evaluation of multiple medical concerns.    He expresses concern about his blood sugar levels, as he was previously diagnosed as prediabetic. He has been making efforts to lose weight through dieting and exercise, including yoga at home, and has seen some success. However, he admits to occasional binge eating due to anxiety. He is considering joining a gym soon. He is interested in trying phentermine for weight loss.    He reports experiencing fatigue, despite using a CPAP machine for his snoring and getting 6 to 7 hours of sleep daily.    He is currently taking Lexapro 15 mg, which has improved his mood and motivation. He has previously taken Wellbutrin, which he found effective. He requests a refill of his Lexapro prescription.    He also requests STD testing due to a recent unprotected encounter, although he typically uses condoms with his male partner. He is considering starting prophylactic treatment in future.    Results  Laboratory Studies  A1c was 5.7 in 2018.     Health Maintenance: Completed    ROS:  ROS    Review of systems unremarkable except for concerns noted by patient or items listed.    Please see HPI for additional ROS.      Objective:     Exam:  /62 (BP Location: Left arm, Patient Position: Sitting, BP Cuff Size: Adult long)   Pulse 78   Temp 36.4 °C (97.6 °F) (Temporal)   Ht 1.753 m (5' 9\")   Wt (!) 138 kg (304 lb 9.6 oz)   SpO2 94%   BMI 44.98 kg/m²  Body mass index is 44.98 kg/m².    Physical Exam  Constitutional:       General: He is not in acute distress.     Appearance: Normal " appearance.   HENT:      Head: Normocephalic.   Cardiovascular:      Rate and Rhythm: Normal rate and regular rhythm.      Pulses: Normal pulses.      Heart sounds: Normal heart sounds.   Pulmonary:      Effort: Pulmonary effort is normal.      Breath sounds: Normal breath sounds.   Skin:     General: Skin is warm.   Neurological:      Mental Status: He is alert and oriented to person, place, and time.   Psychiatric:         Mood and Affect: Mood normal.         Behavior: Behavior normal.             Labs: Reviewed    Assessment & Plan:     24 y.o. male with the following -       1. OFE (generalized anxiety disorder)  Chronic, stable  The patient reports intermittent anxiety, which contributes to episodic binge eating. he is currently on Lexapro 15 mg. The potential benefits of adding Wellbutrin to  regimen were discussed. If needed, the dosage of Lexapro can be adjusted when increasing Wellbutrin.  Patient currently wants to continue Lexapro at current dose and does not want to start Wellbutrin.  Would like to try some weight loss regimen  The patient reports experiencing fatigue. Thyroid function tests will be ordered to rule out thyroid-related causes.The potential impact of Lexapro on fatigue was discussed, but it was noted that the benefits for mood currently outweigh the potential side effects.  - Tirzepatide-Weight Management (ZEPBOUND) 2.5 MG/0.5ML Solution Auto-injector; Inject 2.5 mg under the skin every 7 days.  Dispense: 2 mL; Refill: 1  - escitalopram (LEXAPRO) 15 MG Tab; Take 1 Tablet by mouth every day.  Dispense: 90 Tablet; Refill: 1        2. ROXANE (obstructive sleep apnea)  Chronic, stable   uses a CPAP machine for sleep apnea and reports getting 6-7 hours of sleep per night.  Plan  Continue using CPAP  Recommended weight loss  - Tirzepatide-Weight Management (ZEPBOUND) 2.5 MG/0.5ML Solution Auto-injector; Inject 2.5 mg under the skin every 7 days.  Dispense: 2 mL; Refill: 1    3. Morbid obesity  (HCC)  4. BMI 40.0-44.9, adult (HCC)  The patient has experienced weight gain and reports difficulty maintaining weight loss due to anxiety and binge eating. Lexapro, which he is currently taking, can contribute to weight gain. The benefits and risks of continuing Lexapro versus switching to another medication were discussed. he is currently doing yoga at home and considering going to the gym. Phentermine, an appetite suppressant, was discussed as a potential option, but it may increase anxiety. Wellbutrin, which can reduce food cravings, was also discussed as an adjunct to  current medication regimen. A prescription for Wegovy will be sent, pending prior authorization. If not approved, phentermine will be considered.  - Tirzepatide-Weight Management (ZEPBOUND) 2.5 MG/0.5ML Solution Auto-injector; Inject 2.5 mg under the skin every 7 days.  Dispense: 2 mL; Refill: 1        5. Binge eating disorder  Chronic, stable  Continue Lexapro 15 mg daily  Continue therapy with psychologist  Recommended adding Wellbutrin to the regimen.  Patient wants to hold off additional medication  - escitalopram (LEXAPRO) 10 MG Tab; Take 1 Tablet by mouth every day.  Dispense: 90 Tablet; Refill: 1  - escitalopram (LEXAPRO) 5 MG tablet; Take 1 Tablet by mouth every day.  Dispense: 90 Tablet; Refill: 1      6 Screening examination for sexually transmitted disease  Requesting for STD screening due to unprotected encounter  - Chlamydia/GC, PCR (Urine); Future  - HIV AG/AB Combo Assay Screening; Future  - T.Pallidum AB PARKER (Screening); Future  - Hepatitis C Virus Antibody; Future  - Hep B Core AB Total; Future  - Hep B Surface Antigen; Future      labs  - Hepatitis B Vaccine Adult IM  - Comp Metabolic Panel; Future  - Lipid Profile; Future  - CBC WITHOUT DIFFERENTIAL; Future  - TSH WITH REFLEX TO FT4; Future  - HEMOGLOBIN A1C; Future    4. Health Maintenance.  The patient will have routine labs. STD testing .The first dose of the hepatitis B  vaccine will be administered today, with the second dose in one month and the final dose in six months.    3 months follow-up  Please note that this dictation was created using voice recognition software. I have made every reasonable attempt to correct obvious errors, but I expect that there are errors of grammar and possibly content that I did not discover before finalizing the note.

## 2024-11-15 ENCOUNTER — PHARMACY VISIT (OUTPATIENT)
Dept: PHARMACY | Facility: MEDICAL CENTER | Age: 24
End: 2024-11-15
Payer: COMMERCIAL

## 2024-11-15 ENCOUNTER — HOSPITAL ENCOUNTER (OUTPATIENT)
Dept: LAB | Facility: MEDICAL CENTER | Age: 24
End: 2024-11-15
Attending: STUDENT IN AN ORGANIZED HEALTH CARE EDUCATION/TRAINING PROGRAM
Payer: COMMERCIAL

## 2024-11-15 DIAGNOSIS — Z11.3 SCREENING EXAMINATION FOR SEXUALLY TRANSMITTED DISEASE: ICD-10-CM

## 2024-11-15 PROCEDURE — 86803 HEPATITIS C AB TEST: CPT

## 2024-11-15 PROCEDURE — 36415 COLL VENOUS BLD VENIPUNCTURE: CPT

## 2024-11-15 PROCEDURE — 87389 HIV-1 AG W/HIV-1&-2 AB AG IA: CPT

## 2024-11-15 PROCEDURE — 87591 N.GONORRHOEAE DNA AMP PROB: CPT

## 2024-11-15 PROCEDURE — 87491 CHLMYD TRACH DNA AMP PROBE: CPT

## 2024-11-15 PROCEDURE — 87340 HEPATITIS B SURFACE AG IA: CPT

## 2024-11-15 PROCEDURE — 86780 TREPONEMA PALLIDUM: CPT

## 2024-11-15 PROCEDURE — 86704 HEP B CORE ANTIBODY TOTAL: CPT

## 2024-11-16 LAB
C TRACH DNA SPEC QL NAA+PROBE: NEGATIVE
HBV CORE AB SERPL QL IA: NONREACTIVE
HBV SURFACE AG SER QL: NORMAL
HCV AB SER QL: NORMAL
HIV 1+2 AB+HIV1 P24 AG SERPL QL IA: NORMAL
N GONORRHOEA DNA SPEC QL NAA+PROBE: NEGATIVE
SPECIMEN SOURCE: NORMAL
T PALLIDUM AB SER QL IA: NORMAL

## 2024-12-02 PROCEDURE — RXMED WILLOW AMBULATORY MEDICATION CHARGE: Performed by: STUDENT IN AN ORGANIZED HEALTH CARE EDUCATION/TRAINING PROGRAM

## 2024-12-09 ENCOUNTER — PHARMACY VISIT (OUTPATIENT)
Dept: PHARMACY | Facility: MEDICAL CENTER | Age: 24
End: 2024-12-09
Payer: COMMERCIAL

## 2024-12-30 PROCEDURE — RXMED WILLOW AMBULATORY MEDICATION CHARGE: Performed by: STUDENT IN AN ORGANIZED HEALTH CARE EDUCATION/TRAINING PROGRAM

## 2025-01-02 ENCOUNTER — PHARMACY VISIT (OUTPATIENT)
Dept: PHARMACY | Facility: MEDICAL CENTER | Age: 25
End: 2025-01-02
Payer: COMMERCIAL

## 2025-01-07 ENCOUNTER — HOSPITAL ENCOUNTER (OUTPATIENT)
Dept: LAB | Facility: MEDICAL CENTER | Age: 25
End: 2025-01-07
Attending: STUDENT IN AN ORGANIZED HEALTH CARE EDUCATION/TRAINING PROGRAM
Payer: COMMERCIAL

## 2025-01-07 DIAGNOSIS — Z00.00 HEALTHCARE MAINTENANCE: ICD-10-CM

## 2025-01-07 LAB
ALBUMIN SERPL BCP-MCNC: 4.5 G/DL (ref 3.2–4.9)
ALBUMIN/GLOB SERPL: 1.5 G/DL
ALP SERPL-CCNC: 98 U/L (ref 30–99)
ALT SERPL-CCNC: 55 U/L (ref 2–50)
ANION GAP SERPL CALC-SCNC: 11 MMOL/L (ref 7–16)
AST SERPL-CCNC: 23 U/L (ref 12–45)
BILIRUB SERPL-MCNC: 0.5 MG/DL (ref 0.1–1.5)
BUN SERPL-MCNC: 8 MG/DL (ref 8–22)
CALCIUM ALBUM COR SERPL-MCNC: 9.4 MG/DL (ref 8.5–10.5)
CALCIUM SERPL-MCNC: 9.8 MG/DL (ref 8.5–10.5)
CHLORIDE SERPL-SCNC: 98 MMOL/L (ref 96–112)
CHOLEST SERPL-MCNC: 158 MG/DL (ref 100–199)
CO2 SERPL-SCNC: 26 MMOL/L (ref 20–33)
CREAT SERPL-MCNC: 0.79 MG/DL (ref 0.5–1.4)
ERYTHROCYTE [DISTWIDTH] IN BLOOD BY AUTOMATED COUNT: 42.2 FL (ref 35.9–50)
EST. AVERAGE GLUCOSE BLD GHB EST-MCNC: 94 MG/DL
GFR SERPLBLD CREATININE-BSD FMLA CKD-EPI: 126 ML/MIN/1.73 M 2
GLOBULIN SER CALC-MCNC: 3 G/DL (ref 1.9–3.5)
GLUCOSE SERPL-MCNC: 80 MG/DL (ref 65–99)
HBA1C MFR BLD: 4.9 % (ref 4–5.6)
HCT VFR BLD AUTO: 47.8 % (ref 42–52)
HDLC SERPL-MCNC: 42 MG/DL
HGB BLD-MCNC: 15.3 G/DL (ref 14–18)
LDLC SERPL CALC-MCNC: 104 MG/DL
MCH RBC QN AUTO: 28.4 PG (ref 27–33)
MCHC RBC AUTO-ENTMCNC: 32 G/DL (ref 32.3–36.5)
MCV RBC AUTO: 88.8 FL (ref 81.4–97.8)
PLATELET # BLD AUTO: 309 K/UL (ref 164–446)
PMV BLD AUTO: 10.5 FL (ref 9–12.9)
POTASSIUM SERPL-SCNC: 4.1 MMOL/L (ref 3.6–5.5)
PROT SERPL-MCNC: 7.5 G/DL (ref 6–8.2)
RBC # BLD AUTO: 5.38 M/UL (ref 4.7–6.1)
SODIUM SERPL-SCNC: 135 MMOL/L (ref 135–145)
TRIGL SERPL-MCNC: 62 MG/DL (ref 0–149)
TSH SERPL DL<=0.005 MIU/L-ACNC: 3.6 UIU/ML (ref 0.38–5.33)
WBC # BLD AUTO: 10 K/UL (ref 4.8–10.8)

## 2025-01-07 PROCEDURE — 85027 COMPLETE CBC AUTOMATED: CPT

## 2025-01-07 PROCEDURE — 83036 HEMOGLOBIN GLYCOSYLATED A1C: CPT

## 2025-01-07 PROCEDURE — 84443 ASSAY THYROID STIM HORMONE: CPT

## 2025-01-07 PROCEDURE — 36415 COLL VENOUS BLD VENIPUNCTURE: CPT

## 2025-01-07 PROCEDURE — 80061 LIPID PANEL: CPT

## 2025-01-07 PROCEDURE — 80053 COMPREHEN METABOLIC PANEL: CPT

## 2025-02-03 ENCOUNTER — PHARMACY VISIT (OUTPATIENT)
Dept: PHARMACY | Facility: MEDICAL CENTER | Age: 25
End: 2025-02-03
Payer: COMMERCIAL

## 2025-02-03 PROCEDURE — RXMED WILLOW AMBULATORY MEDICATION CHARGE: Performed by: STUDENT IN AN ORGANIZED HEALTH CARE EDUCATION/TRAINING PROGRAM

## 2025-02-04 ENCOUNTER — APPOINTMENT (OUTPATIENT)
Dept: MEDICAL GROUP | Facility: MEDICAL CENTER | Age: 25
End: 2025-02-04
Payer: COMMERCIAL

## 2025-02-04 ENCOUNTER — PATIENT MESSAGE (OUTPATIENT)
Dept: MEDICAL GROUP | Facility: MEDICAL CENTER | Age: 25
End: 2025-02-04

## 2025-02-04 DIAGNOSIS — Z11.3 SCREENING EXAMINATION FOR SEXUALLY TRANSMITTED DISEASE: ICD-10-CM

## 2025-02-28 PROCEDURE — RXMED WILLOW AMBULATORY MEDICATION CHARGE: Performed by: STUDENT IN AN ORGANIZED HEALTH CARE EDUCATION/TRAINING PROGRAM

## 2025-03-05 ENCOUNTER — PHARMACY VISIT (OUTPATIENT)
Dept: PHARMACY | Facility: MEDICAL CENTER | Age: 25
End: 2025-03-05
Payer: COMMERCIAL

## 2025-04-02 ENCOUNTER — PHARMACY VISIT (OUTPATIENT)
Dept: PHARMACY | Facility: MEDICAL CENTER | Age: 25
End: 2025-04-02
Payer: COMMERCIAL

## 2025-04-02 PROCEDURE — RXMED WILLOW AMBULATORY MEDICATION CHARGE: Performed by: STUDENT IN AN ORGANIZED HEALTH CARE EDUCATION/TRAINING PROGRAM

## 2025-04-08 ENCOUNTER — EH NON-PROVIDER (OUTPATIENT)
Dept: OCCUPATIONAL MEDICINE | Facility: CLINIC | Age: 25
End: 2025-04-08

## 2025-04-08 DIAGNOSIS — Z02.89 ENCOUNTER FOR OCCUPATIONAL HEALTH ASSESSMENT: ICD-10-CM

## 2025-04-08 PROCEDURE — 94375 RESPIRATORY FLOW VOLUME LOOP: CPT | Performed by: PREVENTIVE MEDICINE

## 2025-04-11 ENCOUNTER — PHARMACY VISIT (OUTPATIENT)
Dept: PHARMACY | Facility: MEDICAL CENTER | Age: 25
End: 2025-04-11
Payer: COMMERCIAL

## 2025-04-21 ENCOUNTER — HOSPITAL ENCOUNTER (OUTPATIENT)
Dept: LAB | Facility: MEDICAL CENTER | Age: 25
End: 2025-04-21
Attending: BEHAVIOR ANALYST
Payer: COMMERCIAL

## 2025-04-21 ENCOUNTER — APPOINTMENT (OUTPATIENT)
Dept: MEDICAL GROUP | Facility: MEDICAL CENTER | Age: 25
End: 2025-04-21
Payer: COMMERCIAL

## 2025-04-21 VITALS
DIASTOLIC BLOOD PRESSURE: 78 MMHG | SYSTOLIC BLOOD PRESSURE: 116 MMHG | OXYGEN SATURATION: 94 % | HEIGHT: 69 IN | BODY MASS INDEX: 43.55 KG/M2 | HEART RATE: 81 BPM | TEMPERATURE: 97.9 F | WEIGHT: 294 LBS

## 2025-04-21 DIAGNOSIS — F50.819 BINGE EATING DISORDER: ICD-10-CM

## 2025-04-21 DIAGNOSIS — F41.1 GAD (GENERALIZED ANXIETY DISORDER): ICD-10-CM

## 2025-04-21 DIAGNOSIS — E66.01 MORBID OBESITY (HCC): ICD-10-CM

## 2025-04-21 DIAGNOSIS — Z29.81 ENCOUNTER FOR HIV PRE-EXPOSURE PROPHYLAXIS: ICD-10-CM

## 2025-04-21 DIAGNOSIS — Z11.3 SCREENING EXAMINATION FOR SEXUALLY TRANSMITTED DISEASE: ICD-10-CM

## 2025-04-21 LAB
HBV CORE AB SERPL QL IA: NONREACTIVE
HBV SURFACE AB SERPL IA-ACNC: 10.6 MIU/ML (ref 0–10)
HBV SURFACE AG SER QL: NORMAL
HCV AB SER QL: NORMAL
HIV 1+2 AB+HIV1 P24 AG SERPL QL IA: NORMAL
T PALLIDUM AB SER QL IA: NORMAL

## 2025-04-21 PROCEDURE — 86803 HEPATITIS C AB TEST: CPT

## 2025-04-21 PROCEDURE — 86706 HEP B SURFACE ANTIBODY: CPT

## 2025-04-21 PROCEDURE — 87491 CHLMYD TRACH DNA AMP PROBE: CPT

## 2025-04-21 PROCEDURE — RXMED WILLOW AMBULATORY MEDICATION CHARGE: Performed by: STUDENT IN AN ORGANIZED HEALTH CARE EDUCATION/TRAINING PROGRAM

## 2025-04-21 PROCEDURE — 36415 COLL VENOUS BLD VENIPUNCTURE: CPT

## 2025-04-21 PROCEDURE — 87591 N.GONORRHOEAE DNA AMP PROB: CPT

## 2025-04-21 PROCEDURE — 99214 OFFICE O/P EST MOD 30 MIN: CPT | Performed by: STUDENT IN AN ORGANIZED HEALTH CARE EDUCATION/TRAINING PROGRAM

## 2025-04-21 PROCEDURE — 3078F DIAST BP <80 MM HG: CPT | Performed by: STUDENT IN AN ORGANIZED HEALTH CARE EDUCATION/TRAINING PROGRAM

## 2025-04-21 PROCEDURE — 87389 HIV-1 AG W/HIV-1&-2 AB AG IA: CPT

## 2025-04-21 PROCEDURE — 3074F SYST BP LT 130 MM HG: CPT | Performed by: STUDENT IN AN ORGANIZED HEALTH CARE EDUCATION/TRAINING PROGRAM

## 2025-04-21 PROCEDURE — 86704 HEP B CORE ANTIBODY TOTAL: CPT

## 2025-04-21 PROCEDURE — 87661 TRICHOMONAS VAGINALIS AMPLIF: CPT

## 2025-04-21 PROCEDURE — 87340 HEPATITIS B SURFACE AG IA: CPT

## 2025-04-21 PROCEDURE — 86780 TREPONEMA PALLIDUM: CPT

## 2025-04-21 RX ORDER — ESCITALOPRAM OXALATE 20 MG/1
20 TABLET ORAL DAILY
Qty: 90 TABLET | Refills: 1 | Status: SHIPPED | OUTPATIENT
Start: 2025-04-21

## 2025-04-21 ASSESSMENT — FIBROSIS 4 INDEX: FIB4 SCORE: 0.26

## 2025-04-21 NOTE — PROGRESS NOTES
"  Chief Complaint   Patient presents with    Other     Medication questions       HPI  Amish Curry is a 25 y.o. male here today for PreP for HIV prophylaxis    Patient is homosexual, at high risk for HIV due to homosexual relationship with other men.   Patient is willing to adhere to regular visits and laboratory testings and daily medication.  Negative for acute HIV infection symptoms in the past 4 weeks including fever, sore throat, lymphadenopathy, rash    2. Lab work discussed.    MCHC 32.0 otherwise normal hemoglobin around 15.3 with hematocrit 47.8  Electrolytes within normal range ALT slightly elevated around 55 next LDL slightly elevated at 104 stable. Kidney function test normal   Patient has very low vitamin D levels in the past.  Currently taking vitamin D supplements over-the-counter thyroid function test was normal    3.  Obesity.    Current BMI is 43.3 with weight to 94 pounds.  Patient was started on Zepbound currently at dose of 5 mg once a week.  Reports that he lost about 10 pounds since starting the medication last 4 to 6 months.  Reports that initially he did better but in between there was some inconsistency with his exercise.  He has restarted exercising and maintaining better diet interested in going up on the dose as tolerated well    4.  Anxiety  Currently on Lexapro 15 mg daily.  Requesting if he can increase the dose.  Reports that his symptoms are improved including the binge eating and anxiety but he still feels there are days when he might feel little anxious when he is stressed eating.    Exam:  /78 (BP Location: Left arm, Patient Position: Sitting, BP Cuff Size: Adult)   Pulse 81   Temp 36.6 °C (97.9 °F) (Temporal)   Ht 1.753 m (5' 9\")   Wt (!) 133 kg (294 lb)   SpO2 94%         ROS  Review of systems (+10 systems) unremarkable except for concerns noted by patient or items listed.    Please see HPI for additional ROS.      Physical Exam:    Constitutional: Alert, " oriented in no acute distress.  Psych: Eye contact is good, speech goal directed, affect calm  Eyes: Conjunctiva non-injected, sclera non-icteric.  Lungs: Unlabored respiratory effort, clear to auscultation bilaterally with good excursion, no wheez or rhonci  CV: regular rate and rhythm. No lower extremity edema        A/P:  1. OFE (generalized anxiety disorder)  Chronic, improved but not well-controlled  Continues to report having intermittent episodes when he might feel little anxious and stressed eating though is much better than his symptoms prior to starting the medication.  Plan since he is doing good with Lexapro I will go up on the dose to Lexapro 20 mg once daily  Recommended following up with therapist/psychology  - escitalopram (LEXAPRO) 20 MG tablet; Take 1 Tablet by mouth every day.  Dispense: 90 Tablet; Refill: 1    2. Binge eating disorder  Chronic  Plan  Increase the dose of Lexapro to 20 mg daily for better management  Continue therapy  - escitalopram (LEXAPRO) 20 MG tablet; Take 1 Tablet by mouth every day.  Dispense: 90 Tablet; Refill: 1    4.  Morbid obesity with BMI 43.42  Chronic problem  Some improvement with Mounjaro.  Currently on dose of 5 mg once weekly interested in increasing the dose  Patient reports that he is spending about 600+ dollars for monthly for the prescription.  We discussed trying [employer] [facility] cash pay which might cost him less.  Patient is interested in cash pay options.  Plan  Prescription for Zepbound through [facility] cash [facility] pharmacy.  Prescription faxed today.  Patient is recommended to increase the dose to 7.5 mg once a week and if tolerated well in next 6 to 8 weeks we will increase the dose to 10 mg weekly  Recommended strongly to continue physical activity and exercise at least 5 days a week.  Continue low calorie diet.  Dose of Lexapro increased to help with binging disorder    3.  Encounter for HIV preexposure prophylaxis  4.At risk for HIV due to homosexual  contact  Patient is interested in HIV prophylaxis treatment  Discussed risk benefits of medication  GFR> 60   Negative for acute HIV infection symptoms in the past 4 weeks including fever, sore throat, lymphadenopathy, rash   Last lab results were reviewed by me today and discussed w patient.    Plan:  Discussed Truvada and Descovy.  Given better side effect profile we will consider Descovy  Discussed: safe sex practices,  HIV testing q 3 months,  GFR at 3 months and then q 6 months  STD testing at least q 6 months       F/U: 3 months

## 2025-04-21 NOTE — PROGRESS NOTES
"Subjective:     CC:   Chief Complaint   Patient presents with   • Other     Medication questions       HPI:   Amish presents today with    Lab work discussed.  MCHC 32.0 otherwise normal hemoglobin around 15.3 with hematocrit 47.8  Electrolytes within normal range ALT slightly elevated around 55 next LDL slightly elevated at 104 stable  Patient has very low vitamin D levels in the past.  Currently taking vitamin D supplements over-the-counter thyroid function test was normal  Health Maintenance: Completed    ROS:  ROS    Review of systems unremarkable except for concerns noted by patient or items listed.    Please see HPI for additional ROS.      Objective:     Exam:  /78 (BP Location: Left arm, Patient Position: Sitting, BP Cuff Size: Adult)   Pulse 81   Temp 36.6 °C (97.9 °F) (Temporal)   Ht 1.753 m (5' 9\")   SpO2 94%   BMI 44.98 kg/m²  Body mass index is 44.98 kg/m².    Physical Exam  Constitutional:       General: He is not in acute distress.     Appearance: Normal appearance.   HENT:      Head: Normocephalic.   Cardiovascular:      Rate and Rhythm: Normal rate and regular rhythm.      Pulses: Normal pulses.      Heart sounds: Normal heart sounds.   Pulmonary:      Effort: Pulmonary effort is normal.      Breath sounds: Normal breath sounds.   Skin:     General: Skin is warm.   Neurological:      Mental Status: He is alert and oriented to person, place, and time.   Psychiatric:         Mood and Affect: Mood normal.         Behavior: Behavior normal.           Labs: reviewed    Assessment & Plan:     25 y.o. male with the following -         Please note that this dictation was created using voice recognition software. I have made every reasonable attempt to correct obvious errors, but I expect that there are errors of grammar and possibly content that I did not discover before finalizing the note.        "

## 2025-04-22 LAB
C TRACH DNA SPEC QL NAA+PROBE: NEGATIVE
N GONORRHOEA DNA SPEC QL NAA+PROBE: NEGATIVE
SPEC CONTAINER SPEC: NORMAL
SPECIMEN SOURCE: NORMAL
SPECIMEN SOURCE: NORMAL
T VAGINALIS RRNA SPEC QL NAA+PROBE: NEGATIVE

## 2025-04-22 PROCEDURE — RXMED WILLOW AMBULATORY MEDICATION CHARGE: Performed by: STUDENT IN AN ORGANIZED HEALTH CARE EDUCATION/TRAINING PROGRAM

## 2025-04-25 ENCOUNTER — RESULTS FOLLOW-UP (OUTPATIENT)
Dept: MEDICAL GROUP | Facility: MEDICAL CENTER | Age: 25
End: 2025-04-25
Payer: COMMERCIAL

## 2025-04-26 ENCOUNTER — PHARMACY VISIT (OUTPATIENT)
Dept: PHARMACY | Facility: MEDICAL CENTER | Age: 25
End: 2025-04-26
Payer: COMMERCIAL

## 2025-05-07 ENCOUNTER — PATIENT MESSAGE (OUTPATIENT)
Dept: MEDICAL GROUP | Facility: MEDICAL CENTER | Age: 25
End: 2025-05-07
Payer: COMMERCIAL

## 2025-05-07 DIAGNOSIS — E66.01 MORBID OBESITY (HCC): ICD-10-CM

## 2025-05-07 DIAGNOSIS — G47.33 OSA (OBSTRUCTIVE SLEEP APNEA): ICD-10-CM

## 2025-05-13 ENCOUNTER — PATIENT MESSAGE (OUTPATIENT)
Dept: MEDICAL GROUP | Facility: MEDICAL CENTER | Age: 25
End: 2025-05-13
Payer: COMMERCIAL

## 2025-05-13 DIAGNOSIS — G47.33 OSA (OBSTRUCTIVE SLEEP APNEA): ICD-10-CM

## 2025-05-14 PROCEDURE — RXMED WILLOW AMBULATORY MEDICATION CHARGE: Performed by: STUDENT IN AN ORGANIZED HEALTH CARE EDUCATION/TRAINING PROGRAM

## 2025-05-14 RX ORDER — TIRZEPATIDE 7.5 MG/.5ML
7.5 INJECTION, SOLUTION SUBCUTANEOUS
Qty: 2 ML | Refills: 1 | Status: SHIPPED | OUTPATIENT
Start: 2025-05-14

## 2025-05-21 ENCOUNTER — PHARMACY VISIT (OUTPATIENT)
Dept: PHARMACY | Facility: MEDICAL CENTER | Age: 25
End: 2025-05-21
Payer: COMMERCIAL

## 2025-06-09 PROCEDURE — RXMED WILLOW AMBULATORY MEDICATION CHARGE: Performed by: STUDENT IN AN ORGANIZED HEALTH CARE EDUCATION/TRAINING PROGRAM

## 2025-06-18 ENCOUNTER — PHARMACY VISIT (OUTPATIENT)
Dept: PHARMACY | Facility: MEDICAL CENTER | Age: 25
End: 2025-06-18
Payer: COMMERCIAL

## 2025-06-18 ENCOUNTER — OFFICE VISIT (OUTPATIENT)
Dept: URGENT CARE | Facility: CLINIC | Age: 25
End: 2025-06-18
Payer: COMMERCIAL

## 2025-06-18 VITALS
BODY MASS INDEX: 43.69 KG/M2 | TEMPERATURE: 97.6 F | RESPIRATION RATE: 12 BRPM | HEIGHT: 69 IN | DIASTOLIC BLOOD PRESSURE: 88 MMHG | OXYGEN SATURATION: 98 % | WEIGHT: 295 LBS | SYSTOLIC BLOOD PRESSURE: 130 MMHG | HEART RATE: 68 BPM

## 2025-06-18 DIAGNOSIS — J98.8 RTI (RESPIRATORY TRACT INFECTION): ICD-10-CM

## 2025-06-18 DIAGNOSIS — H65.01 NON-RECURRENT ACUTE SEROUS OTITIS MEDIA OF RIGHT EAR: ICD-10-CM

## 2025-06-18 PROCEDURE — 3075F SYST BP GE 130 - 139MM HG: CPT | Performed by: PHYSICIAN ASSISTANT

## 2025-06-18 PROCEDURE — 3079F DIAST BP 80-89 MM HG: CPT | Performed by: PHYSICIAN ASSISTANT

## 2025-06-18 PROCEDURE — RXMED WILLOW AMBULATORY MEDICATION CHARGE: Performed by: PHYSICIAN ASSISTANT

## 2025-06-18 PROCEDURE — 99214 OFFICE O/P EST MOD 30 MIN: CPT | Performed by: PHYSICIAN ASSISTANT

## 2025-06-18 RX ORDER — DEXTROMETHORPHAN HYDROBROMIDE AND PROMETHAZINE HYDROCHLORIDE 15; 6.25 MG/5ML; MG/5ML
5 SYRUP ORAL 3 TIMES DAILY PRN
Qty: 120 ML | Refills: 0 | Status: SHIPPED | OUTPATIENT
Start: 2025-06-18

## 2025-06-18 RX ORDER — METHYLPREDNISOLONE 4 MG/1
TABLET ORAL
Qty: 21 TABLET | Refills: 0 | Status: SHIPPED | OUTPATIENT
Start: 2025-06-18

## 2025-06-18 ASSESSMENT — ENCOUNTER SYMPTOMS
SORE THROAT: 1
MYALGIAS: 1
RHINORRHEA: 1
CARDIOVASCULAR NEGATIVE: 1
SHORTNESS OF BREATH: 1
SWEATS: 0
COUGH: 1
FEVER: 0
WHEEZING: 0
PALPITATIONS: 0
HEADACHES: 0
CHILLS: 1
GASTROINTESTINAL NEGATIVE: 1

## 2025-06-18 ASSESSMENT — FIBROSIS 4 INDEX: FIB4 SCORE: 0.26

## 2025-06-19 NOTE — PROGRESS NOTES
Subjective     Amish Curry is a very pleasant 25 y.o. male who presents with Cough (X 4 weeks)            Cough  This is a new problem. The current episode started 1 to 4 weeks ago. The problem has been unchanged. The problem occurs hourly. The cough is Productive of sputum. Associated symptoms include chills, myalgias, rhinorrhea, a sore throat and shortness of breath. Pertinent negatives include no chest pain, ear pain, fever, headaches, nasal congestion, postnasal drip, sweats or wheezing. He has tried nothing for the symptoms. The treatment provided no relief. There is no history of asthma, bronchitis, environmental allergies or pneumonia.         PMH:  has a past medical history of Abdominal pain, Allergy, Anxiety, Apnea, sleep, Back pain, Back pain, Blood in urine, Blurred vision, Breath shortness, Chills, Constipation, Daytime sleepiness, Daytime sleepiness (03/25/2022), Depression, Diarrhea, Dizziness, Earache, Eye pain, Fatigue, Frequent headaches, Frequent urination, Gasping for breath, Headache, Hoarseness, persistent, Insomnia, Morning headache, Nausea, Obesity, Other specified disorder of intestines, Pain, Painful urination, Rhinitis, Right upper quadrant abdominal pain (01/05/2023), Ringing in ears, Snoring, Sore muscles, Sore throat, chronic, and Toothache.  MEDS: Current Medications[1]  ALLERGIES: Allergies[2]  SURGHX: Past Surgical History[3]  SOCHX:  reports that he has never smoked. He has never used smokeless tobacco. He reports current alcohol use. He reports that he does not use drugs.  FH: family history includes Cancer in his paternal grandmother; Diabetes in his maternal grandmother and paternal grandmother; Hyperlipidemia in his father; Ovarian Cancer in his paternal grandmother; Peritoneal Cancer in his maternal uncle; Stroke in his paternal grandfather.      Review of Systems   Constitutional:  Positive for chills. Negative for fever.   HENT:  Positive for congestion, rhinorrhea  "and sore throat. Negative for ear pain and postnasal drip.    Respiratory:  Positive for cough and shortness of breath. Negative for wheezing.    Cardiovascular: Negative.  Negative for chest pain, palpitations and leg swelling.   Gastrointestinal: Negative.    Musculoskeletal:  Positive for myalgias.   Neurological:  Negative for headaches.   Endo/Heme/Allergies:  Negative for environmental allergies.       Medications, Allergies, and current problem list reviewed today in Epic           Objective     /88   Pulse 68   Temp 36.4 °C (97.6 °F) (Temporal)   Resp 12   Ht 1.753 m (5' 9\")   Wt (!) 134 kg (295 lb)   SpO2 98%   BMI 43.56 kg/m²      Physical Exam  Vitals and nursing note reviewed.   Constitutional:       General: He is not in acute distress.     Appearance: Normal appearance. He is well-developed. He is not ill-appearing, toxic-appearing or diaphoretic.   HENT:      Head: Normocephalic and atraumatic.      Right Ear: Ear canal and external ear normal. A middle ear effusion is present. Tympanic membrane is erythematous and bulging. Tympanic membrane is not perforated.      Left Ear: Tympanic membrane, ear canal and external ear normal.      Nose: Congestion and rhinorrhea present.      Mouth/Throat:      Mouth: Mucous membranes are moist.      Pharynx: Oropharynx is clear. No oropharyngeal exudate or posterior oropharyngeal erythema.   Eyes:      General:         Right eye: No discharge.         Left eye: No discharge.      Conjunctiva/sclera: Conjunctivae normal.   Cardiovascular:      Rate and Rhythm: Normal rate and regular rhythm.      Pulses: Normal pulses.      Heart sounds: Normal heart sounds. No murmur heard.  Pulmonary:      Effort: Pulmonary effort is normal. No respiratory distress.      Breath sounds: No stridor. Decreased breath sounds present. No wheezing, rhonchi or rales.      Comments: Productive cough  Chest:      Chest wall: No tenderness.   Musculoskeletal:         General: " No swelling or tenderness.      Cervical back: Normal range of motion and neck supple.      Right lower leg: No edema.      Left lower leg: No edema.   Lymphadenopathy:      Cervical: No cervical adenopathy.   Skin:     General: Skin is warm and dry.   Neurological:      General: No focal deficit present.      Mental Status: He is alert and oriented to person, place, and time. Mental status is at baseline.   Psychiatric:         Mood and Affect: Mood normal.         Behavior: Behavior normal.         Thought Content: Thought content normal.         Judgment: Judgment normal.                                  Assessment & Plan  Non-recurrent acute serous otitis media of right ear  Very pleasant 25-year-old male presented with 4 weeks of worsening cough congestion shortness of breath and wheezing.  Intermittent fever chills and bodyaches.  Still having URI symptoms and now having right ear pain.  Exam shows right serous AOM.  Productive cough but no wheezing rhonchi rales or stridor.  Treating for protracted RTI with AOM.  ER and red flag symptoms discussed at length.      Orders:    methylPREDNISolone (MEDROL DOSEPAK) 4 MG Tablet Therapy Pack; Follow schedule on package instructions.    amoxicillin-clavulanate (AUGMENTIN) 875-125 MG Tab; Take 1 Tablet by mouth 2 times a day.    promethazine-dextromethorphan (PROMETHAZINE-DM) 6.25-15 MG/5ML syrup; Take 5 mL by mouth 3 times a day as needed for Cough.    RTI (respiratory tract infection)    Orders:    methylPREDNISolone (MEDROL DOSEPAK) 4 MG Tablet Therapy Pack; Follow schedule on package instructions.    amoxicillin-clavulanate (AUGMENTIN) 875-125 MG Tab; Take 1 Tablet by mouth 2 times a day.    promethazine-dextromethorphan (PROMETHAZINE-DM) 6.25-15 MG/5ML syrup; Take 5 mL by mouth 3 times a day as needed for Cough.           I personally reviewed prior external notes and test results pertinent to today's visit. Return to clinic or go to ED if symptoms worsen or  persist. Red flag symptoms and indications for ED discussed at length. Patient/Parent/Guardian voices understanding.  AVS with post-visit instructions printed and provided or given verbally.  Follow-up with your primary care provider in 3-5 days. All side effects and potential interactions of prescribed medication discussed including allergic response, GI upset, tendon injury, rash, sedation, OCP effectiveness, etc.    Please note that this dictation was created using voice recognition software. I have made every reasonable attempt to correct obvious errors, but I expect that there are errors of grammar and possibly content that I did not discover before finalizing the note.             [1]   Current Outpatient Medications:     Tirzepatide-Weight Management (ZEPBOUND) 7.5 MG/0.5ML Solution Auto-injector, Inject 7.5 mg under the skin every 7 days., Disp: 2 mL, Rfl: 1    emtricitabine-TAF (DESCOVY) 200-25 MG Tab tablet, Take 1 Tablet by mouth every day., Disp: 30 Tablet, Rfl: 5    escitalopram (LEXAPRO) 20 MG tablet, Take 1 Tablet by mouth every day., Disp: 90 Tablet, Rfl: 1    ergocalciferol (DRISDOL) 19723 UNIT capsule, Take 1 Capsule by mouth every 7 days., Disp: 12 Capsule, Rfl: 1  [2] No Known Allergies  [3]   Past Surgical History:  Procedure Laterality Date    COURTNEY BY LAPAROSCOPY N/A 8/6/2015    Procedure: COURTNEY BY LAPAROSCOPY;  Surgeon: Roya Adams M.D.;  Location: SURGERY [facility];  Service:     EYE MUSCLE REPAIR      EYE SURGERY

## 2025-07-01 PROCEDURE — RXMED WILLOW AMBULATORY MEDICATION CHARGE: Performed by: STUDENT IN AN ORGANIZED HEALTH CARE EDUCATION/TRAINING PROGRAM

## 2025-07-02 ENCOUNTER — PHARMACY VISIT (OUTPATIENT)
Dept: PHARMACY | Facility: MEDICAL CENTER | Age: 25
End: 2025-07-02
Payer: COMMERCIAL

## 2025-07-11 PROCEDURE — RXMED WILLOW AMBULATORY MEDICATION CHARGE: Performed by: STUDENT IN AN ORGANIZED HEALTH CARE EDUCATION/TRAINING PROGRAM

## 2025-07-12 ENCOUNTER — PHARMACY VISIT (OUTPATIENT)
Dept: PHARMACY | Facility: MEDICAL CENTER | Age: 25
End: 2025-07-12
Payer: COMMERCIAL

## 2025-07-29 DIAGNOSIS — G47.33 OSA (OBSTRUCTIVE SLEEP APNEA): ICD-10-CM

## 2025-07-29 RX ORDER — TIRZEPATIDE 7.5 MG/.5ML
7.5 INJECTION, SOLUTION SUBCUTANEOUS
Qty: 2 ML | Refills: 1 | Status: SHIPPED | OUTPATIENT
Start: 2025-07-29

## 2025-07-30 PROCEDURE — RXMED WILLOW AMBULATORY MEDICATION CHARGE: Performed by: STUDENT IN AN ORGANIZED HEALTH CARE EDUCATION/TRAINING PROGRAM

## 2025-08-06 ENCOUNTER — PHARMACY VISIT (OUTPATIENT)
Dept: PHARMACY | Facility: MEDICAL CENTER | Age: 25
End: 2025-08-06
Payer: COMMERCIAL

## 2025-08-07 ENCOUNTER — PATIENT MESSAGE (OUTPATIENT)
Dept: MEDICAL GROUP | Facility: MEDICAL CENTER | Age: 25
End: 2025-08-07
Payer: COMMERCIAL

## 2025-08-07 DIAGNOSIS — Z11.3 SCREENING EXAMINATION FOR SEXUALLY TRANSMITTED DISEASE: Primary | ICD-10-CM
